# Patient Record
Sex: FEMALE | Race: WHITE | NOT HISPANIC OR LATINO | Employment: OTHER | URBAN - METROPOLITAN AREA
[De-identification: names, ages, dates, MRNs, and addresses within clinical notes are randomized per-mention and may not be internally consistent; named-entity substitution may affect disease eponyms.]

---

## 2021-03-22 ENCOUNTER — APPOINTMENT (EMERGENCY)
Dept: RADIOLOGY | Facility: HOSPITAL | Age: 76
DRG: 281 | End: 2021-03-22
Payer: COMMERCIAL

## 2021-03-22 ENCOUNTER — HOSPITAL ENCOUNTER (INPATIENT)
Facility: HOSPITAL | Age: 76
LOS: 1 days | DRG: 281 | End: 2021-03-23
Attending: EMERGENCY MEDICINE | Admitting: INTERNAL MEDICINE
Payer: COMMERCIAL

## 2021-03-22 DIAGNOSIS — I21.4 NSTEMI (NON-ST ELEVATED MYOCARDIAL INFARCTION) (HCC): Primary | ICD-10-CM

## 2021-03-22 PROBLEM — R07.9 CHEST PAIN: Status: ACTIVE | Noted: 2021-03-22

## 2021-03-22 PROBLEM — K21.9 GERD (GASTROESOPHAGEAL REFLUX DISEASE): Status: ACTIVE | Noted: 2021-03-22

## 2021-03-22 PROBLEM — R74.01 ELEVATED AST (SGOT): Status: ACTIVE | Noted: 2021-03-22

## 2021-03-22 LAB
ALBUMIN SERPL BCP-MCNC: 3.5 G/DL (ref 3.5–5)
ALBUMIN SERPL BCP-MCNC: 3.8 G/DL (ref 3.5–5)
ALP SERPL-CCNC: 55 U/L (ref 46–116)
ALP SERPL-CCNC: 63 U/L (ref 46–116)
ALT SERPL W P-5'-P-CCNC: 48 U/L (ref 12–78)
ALT SERPL W P-5'-P-CCNC: 48 U/L (ref 12–78)
ANION GAP SERPL CALCULATED.3IONS-SCNC: 10 MMOL/L (ref 4–13)
ANION GAP SERPL CALCULATED.3IONS-SCNC: 12 MMOL/L (ref 4–13)
APTT PPP: 28 SECONDS (ref 23–37)
AST SERPL W P-5'-P-CCNC: 289 U/L (ref 5–45)
AST SERPL W P-5'-P-CCNC: 340 U/L (ref 5–45)
BASOPHILS # BLD AUTO: 0.02 THOUSANDS/ΜL (ref 0–0.1)
BASOPHILS NFR BLD AUTO: 0 % (ref 0–1)
BILIRUB SERPL-MCNC: 0.4 MG/DL (ref 0.2–1)
BILIRUB SERPL-MCNC: 0.41 MG/DL (ref 0.2–1)
BUN SERPL-MCNC: 13 MG/DL (ref 5–25)
BUN SERPL-MCNC: 14 MG/DL (ref 5–25)
CALCIUM SERPL-MCNC: 8.8 MG/DL (ref 8.3–10.1)
CALCIUM SERPL-MCNC: 8.9 MG/DL (ref 8.3–10.1)
CHLORIDE SERPL-SCNC: 100 MMOL/L (ref 100–108)
CHLORIDE SERPL-SCNC: 104 MMOL/L (ref 100–108)
CO2 SERPL-SCNC: 24 MMOL/L (ref 21–32)
CO2 SERPL-SCNC: 28 MMOL/L (ref 21–32)
CREAT SERPL-MCNC: 0.92 MG/DL (ref 0.6–1.3)
CREAT SERPL-MCNC: 0.97 MG/DL (ref 0.6–1.3)
EOSINOPHIL # BLD AUTO: 0.03 THOUSAND/ΜL (ref 0–0.61)
EOSINOPHIL NFR BLD AUTO: 0 % (ref 0–6)
ERYTHROCYTE [DISTWIDTH] IN BLOOD BY AUTOMATED COUNT: 15 % (ref 11.6–15.1)
GFR SERPL CREATININE-BSD FRML MDRD: 58 ML/MIN/1.73SQ M
GFR SERPL CREATININE-BSD FRML MDRD: 62 ML/MIN/1.73SQ M
GLUCOSE SERPL-MCNC: 124 MG/DL (ref 65–140)
GLUCOSE SERPL-MCNC: 127 MG/DL (ref 65–140)
HCT VFR BLD AUTO: 42.2 % (ref 34.8–46.1)
HGB BLD-MCNC: 13.4 G/DL (ref 11.5–15.4)
IMM GRANULOCYTES # BLD AUTO: 0.06 THOUSAND/UL (ref 0–0.2)
IMM GRANULOCYTES NFR BLD AUTO: 1 % (ref 0–2)
INR PPP: 0.95 (ref 0.84–1.19)
LYMPHOCYTES # BLD AUTO: 1.99 THOUSANDS/ΜL (ref 0.6–4.47)
LYMPHOCYTES NFR BLD AUTO: 20 % (ref 14–44)
MCH RBC QN AUTO: 28.9 PG (ref 26.8–34.3)
MCHC RBC AUTO-ENTMCNC: 31.8 G/DL (ref 31.4–37.4)
MCV RBC AUTO: 91 FL (ref 82–98)
MONOCYTES # BLD AUTO: 0.47 THOUSAND/ΜL (ref 0.17–1.22)
MONOCYTES NFR BLD AUTO: 5 % (ref 4–12)
NEUTROPHILS # BLD AUTO: 7.34 THOUSANDS/ΜL (ref 1.85–7.62)
NEUTS SEG NFR BLD AUTO: 74 % (ref 43–75)
NRBC BLD AUTO-RTO: 0 /100 WBCS
PLATELET # BLD AUTO: 216 THOUSANDS/UL (ref 149–390)
PMV BLD AUTO: 10.1 FL (ref 8.9–12.7)
POTASSIUM SERPL-SCNC: 4.3 MMOL/L (ref 3.5–5.3)
POTASSIUM SERPL-SCNC: 4.7 MMOL/L (ref 3.5–5.3)
PROT SERPL-MCNC: 7.2 G/DL (ref 6.4–8.2)
PROT SERPL-MCNC: 8 G/DL (ref 6.4–8.2)
PROTHROMBIN TIME: 12.8 SECONDS (ref 11.6–14.5)
RBC # BLD AUTO: 4.64 MILLION/UL (ref 3.81–5.12)
SODIUM SERPL-SCNC: 138 MMOL/L (ref 136–145)
SODIUM SERPL-SCNC: 140 MMOL/L (ref 136–145)
TROPONIN I SERPL-MCNC: >40 NG/ML
TROPONIN I SERPL-MCNC: >40 NG/ML
WBC # BLD AUTO: 9.91 THOUSAND/UL (ref 4.31–10.16)

## 2021-03-22 PROCEDURE — 93005 ELECTROCARDIOGRAM TRACING: CPT

## 2021-03-22 PROCEDURE — 80053 COMPREHEN METABOLIC PANEL: CPT | Performed by: EMERGENCY MEDICINE

## 2021-03-22 PROCEDURE — 85025 COMPLETE CBC W/AUTO DIFF WBC: CPT | Performed by: EMERGENCY MEDICINE

## 2021-03-22 PROCEDURE — 85730 THROMBOPLASTIN TIME PARTIAL: CPT | Performed by: EMERGENCY MEDICINE

## 2021-03-22 PROCEDURE — 99291 CRITICAL CARE FIRST HOUR: CPT | Performed by: EMERGENCY MEDICINE

## 2021-03-22 PROCEDURE — 84484 ASSAY OF TROPONIN QUANT: CPT | Performed by: EMERGENCY MEDICINE

## 2021-03-22 PROCEDURE — 84484 ASSAY OF TROPONIN QUANT: CPT | Performed by: PHYSICIAN ASSISTANT

## 2021-03-22 PROCEDURE — 36415 COLL VENOUS BLD VENIPUNCTURE: CPT | Performed by: EMERGENCY MEDICINE

## 2021-03-22 PROCEDURE — 99223 1ST HOSP IP/OBS HIGH 75: CPT | Performed by: PHYSICIAN ASSISTANT

## 2021-03-22 PROCEDURE — 71045 X-RAY EXAM CHEST 1 VIEW: CPT

## 2021-03-22 PROCEDURE — 96375 TX/PRO/DX INJ NEW DRUG ADDON: CPT

## 2021-03-22 PROCEDURE — 85610 PROTHROMBIN TIME: CPT | Performed by: EMERGENCY MEDICINE

## 2021-03-22 PROCEDURE — 99291 CRITICAL CARE FIRST HOUR: CPT

## 2021-03-22 PROCEDURE — 96365 THER/PROPH/DIAG IV INF INIT: CPT

## 2021-03-22 PROCEDURE — 80053 COMPREHEN METABOLIC PANEL: CPT | Performed by: PHYSICIAN ASSISTANT

## 2021-03-22 RX ORDER — ATORVASTATIN CALCIUM 80 MG/1
80 TABLET, FILM COATED ORAL DAILY
COMMUNITY

## 2021-03-22 RX ORDER — ASPIRIN 81 MG/1
324 TABLET, CHEWABLE ORAL ONCE
Status: DISCONTINUED | OUTPATIENT
Start: 2021-03-22 | End: 2021-03-22

## 2021-03-22 RX ORDER — AMLODIPINE BESYLATE 5 MG/1
5 TABLET ORAL DAILY
COMMUNITY
End: 2021-03-22

## 2021-03-22 RX ORDER — HEPARIN SODIUM 10000 [USP'U]/100ML
3-20 INJECTION, SOLUTION INTRAVENOUS
Status: DISCONTINUED | OUTPATIENT
Start: 2021-03-22 | End: 2021-03-23 | Stop reason: HOSPADM

## 2021-03-22 RX ORDER — AMLODIPINE BESYLATE 5 MG/1
5 TABLET ORAL DAILY
Status: DISCONTINUED | OUTPATIENT
Start: 2021-03-23 | End: 2021-03-22

## 2021-03-22 RX ORDER — PANTOPRAZOLE SODIUM 40 MG/1
40 TABLET, DELAYED RELEASE ORAL
Status: DISCONTINUED | OUTPATIENT
Start: 2021-03-23 | End: 2021-03-23

## 2021-03-22 RX ORDER — ONDANSETRON 2 MG/ML
4 INJECTION INTRAMUSCULAR; INTRAVENOUS EVERY 6 HOURS PRN
Status: DISCONTINUED | OUTPATIENT
Start: 2021-03-22 | End: 2021-03-23

## 2021-03-22 RX ORDER — ATENOLOL 25 MG/1
50 TABLET ORAL 2 TIMES DAILY
Status: DISCONTINUED | OUTPATIENT
Start: 2021-03-23 | End: 2021-03-23

## 2021-03-22 RX ORDER — ACETAMINOPHEN 325 MG/1
650 TABLET ORAL EVERY 6 HOURS PRN
Status: DISCONTINUED | OUTPATIENT
Start: 2021-03-22 | End: 2021-03-22

## 2021-03-22 RX ORDER — NICOTINE POLACRILEX 4 MG/1
20 GUM, CHEWING ORAL DAILY
COMMUNITY

## 2021-03-22 RX ORDER — ATORVASTATIN CALCIUM 40 MG/1
80 TABLET, FILM COATED ORAL EVERY EVENING
Status: DISCONTINUED | OUTPATIENT
Start: 2021-03-22 | End: 2021-03-23 | Stop reason: HOSPADM

## 2021-03-22 RX ORDER — ASPIRIN 81 MG/1
81 TABLET, CHEWABLE ORAL DAILY
Status: DISCONTINUED | OUTPATIENT
Start: 2021-03-23 | End: 2021-03-23 | Stop reason: HOSPADM

## 2021-03-22 RX ORDER — ATENOLOL 25 MG/1
50 TABLET ORAL 2 TIMES DAILY
Status: DISCONTINUED | OUTPATIENT
Start: 2021-03-22 | End: 2021-03-22

## 2021-03-22 RX ORDER — HEPARIN SODIUM 1000 [USP'U]/ML
4000 INJECTION, SOLUTION INTRAVENOUS; SUBCUTANEOUS ONCE
Status: COMPLETED | OUTPATIENT
Start: 2021-03-22 | End: 2021-03-22

## 2021-03-22 RX ORDER — METOPROLOL TARTRATE 5 MG/5ML
5 INJECTION INTRAVENOUS EVERY 6 HOURS PRN
Status: DISCONTINUED | OUTPATIENT
Start: 2021-03-22 | End: 2021-03-23

## 2021-03-22 RX ORDER — ATENOLOL 50 MG/1
50 TABLET ORAL 2 TIMES DAILY
COMMUNITY
End: 2021-04-05 | Stop reason: HOSPADM

## 2021-03-22 RX ADMIN — METOPROLOL TARTRATE 5 MG: 5 INJECTION INTRAVENOUS at 22:31

## 2021-03-22 RX ADMIN — HEPARIN SODIUM 4000 UNITS: 1000 INJECTION INTRAVENOUS; SUBCUTANEOUS at 19:35

## 2021-03-22 RX ADMIN — NITROGLYCERIN 1 INCH: 20 OINTMENT TOPICAL at 22:33

## 2021-03-22 RX ADMIN — TICAGRELOR 180 MG: 90 TABLET ORAL at 20:31

## 2021-03-22 RX ADMIN — HEPARIN SODIUM 11.1 UNITS/KG/HR: 10000 INJECTION, SOLUTION INTRAVENOUS at 19:37

## 2021-03-22 NOTE — ED PROVIDER NOTES
History  Chief Complaint   Patient presents with    Chest Pain     pt states around 730 am she started w/ central CP, no radiation and "small episode of diaphoresis and nausea " pts symtpoms improving since this morning  65 asa given      70 y o  female presents via EMS after her grandson called the local squad  She reports this morning around 7:30 she had chest pain, dyspnea, cold sweats and nausea which resolved  She reports she was left with a very minor discomfort in a pinpoint position over her left chest   She informed her grandson of this slight discomfort when he returned from work this afternoon which is when he called over to the ems crew  Upon EMS arrival the patient had no complaints but their initial EKG showed some elevation in III and AVF with possible reciprocal changes in AVL  History provided by:  Patient, medical records and EMS personnel   used: No    Chest Pain  Pain location:  L chest  Pain quality: aching    Pain radiates to:  Does not radiate  Pain radiates to the back: no    Pain severity:  Mild  Onset quality:  Sudden  Duration:  12 hours  Timing:  Constant  Progression:  Improving  Chronicity:  New  Relieved by:  Nothing  Worsened by:  Nothing tried  Ineffective treatments:  None tried  Associated symptoms: diaphoresis, nausea and shortness of breath    Associated symptoms: no fever, no palpitations and not vomiting    Associated symptoms comment:  All associated symptoms are now resolved   Risk factors: diabetes mellitus and obesity        None       Past Medical History:   Diagnosis Date    Diabetes mellitus ([facility] [address] )     Hypertension        History reviewed  No pertinent surgical history  History reviewed  No pertinent family history  I have reviewed and agree with the history as documented      E-Cigarette/Vaping     E-Cigarette/Vaping Substances     Social History     Tobacco Use    Smoking status: Never Smoker   Substance Use Topics    Alcohol use: Not Currently    Drug use: Not Currently       Review of Systems   Constitutional: Positive for diaphoresis  Negative for chills and fever  Respiratory: Positive for shortness of breath  Cardiovascular: Positive for chest pain  Negative for palpitations  Gastrointestinal: Positive for nausea  Negative for diarrhea and vomiting  Genitourinary: Negative for dysuria and frequency  Skin: Negative for rash  All other systems reviewed and are negative  Physical Exam  Physical Exam  Vitals signs and nursing note reviewed  Constitutional:       General: She is not in acute distress  Appearance: She is well-developed  She is obese  HENT:      Head: Normocephalic and atraumatic  Eyes:      Pupils: Pupils are equal, round, and reactive to light  Neck:      Musculoskeletal: Normal range of motion  Vascular: No JVD  Cardiovascular:      Rate and Rhythm: Normal rate and regular rhythm  Heart sounds: Normal heart sounds  No murmur  No friction rub  No gallop  Pulmonary:      Effort: Pulmonary effort is normal  No respiratory distress  Breath sounds: Normal breath sounds  No wheezing or rales  Chest:      Chest wall: No tenderness  Musculoskeletal: Normal range of motion  General: No tenderness  Skin:     General: Skin is warm and dry  Coloration: Skin is pale  Neurological:      General: No focal deficit present  Mental Status: She is alert and oriented to person, place, and time  Psychiatric:         Behavior: Behavior normal          Thought Content:  Thought content normal          Judgment: Judgment normal          Vital Signs  ED Triage Vitals   Temp Pulse Respirations Blood Pressure SpO2   -- 03/22/21 1915 03/22/21 1915 03/22/21 1917 03/22/21 1915    90 18 146/92 94 %      Temp src Heart Rate Source Patient Position - Orthostatic VS BP Location FiO2 (%)   -- 03/22/21 1915 -- -- --    Monitor         Pain Score       03/22/21 1915       1 Vitals:    03/22/21 1915 03/22/21 1917   BP:  146/92   Pulse: 90          Visual Acuity      ED Medications  Medications   heparin (porcine) 25,000 units in 0 45% NaCl 250 mL infusion (premix) (11 1 Units/kg/hr × 90 kg (Order-Specific) Intravenous New Bag 3/22/21 1937)   ticagrelor (BRILINTA) tablet 180 mg (has no administration in time range)   heparin (porcine) injection 4,000 Units (4,000 Units Intravenous Given 3/22/21 1935)       Diagnostic Studies  Results Reviewed     Procedure Component Value Units Date/Time    Troponin I [[id number]]  (Abnormal) Collected: 03/22/21 1919    Lab Status: Final result Specimen: Blood from Arm, Right Updated: 03/22/21 1949     Troponin I >40 00 ng/mL     Comprehensive metabolic panel [[id number]]  (Abnormal) Collected: 03/22/21 1919    Lab Status: Final result Specimen: Blood from Arm, Right Updated: 03/22/21 1941     Sodium 140 mmol/L      Potassium 4 7 mmol/L      Chloride 100 mmol/L      CO2 28 mmol/L      ANION GAP 12 mmol/L      BUN 14 mg/dL      Creatinine 0 97 mg/dL      Glucose 124 mg/dL      Calcium 8 9 mg/dL       U/L      ALT 48 U/L      Alkaline Phosphatase 63 U/L      Total Protein 8 0 g/dL      Albumin 3 8 g/dL      Total Bilirubin 0 40 mg/dL      eGFR 58 ml/min/1 73sq m     Narrative:      [place] guidelines for Chronic Kidney Disease (CKD):     Stage 1 with normal or high GFR (GFR > 90 mL/min/1 73 square meters)    Stage 2 Mild CKD (GFR = 60-89 mL/min/1 73 square meters)    Stage 3A Moderate CKD (GFR = 45-59 mL/min/1 73 square meters)    Stage 3B Moderate CKD (GFR = 30-44 mL/min/1 73 square meters)    Stage 4 Severe CKD (GFR = 15-29 mL/min/1 73 square meters)    Stage 5 End Stage CKD (GFR <15 mL/min/1 73 square meters)  Note: GFR calculation is accurate only with a steady state creatinine    APTT six (6) hours after Heparin bolus/drip initiation or dosing change [[id number]]  (Normal) Collected: 03/22/21 1919    Lab Status: Final result Specimen: Blood from Arm, Right Updated: 03/22/21 1937     PTT 28 seconds     Protime-INR [[id number]]  (Normal) Collected: 03/22/21 1919    Lab Status: Final result Specimen: Blood from Arm, Right Updated: 03/22/21 1937     Protime 12 8 seconds      INR 0 95    CBC and differential [[id number]] Collected: 03/22/21 1919    Lab Status: Final result Specimen: Blood from Arm, Right Updated: 03/22/21 1928     WBC 9 91 Thousand/uL      RBC 4 64 Million/uL      Hemoglobin 13 4 g/dL      Hematocrit 42 2 %      MCV 91 fL      MCH 28 9 pg      MCHC 31 8 g/dL      RDW 15 0 %      MPV 10 1 fL      Platelets 888 Thousands/uL      nRBC 0 /100 WBCs      Neutrophils Relative 74 %      Immat GRANS % 1 %      Lymphocytes Relative 20 %      Monocytes Relative 5 %      Eosinophils Relative 0 %      Basophils Relative 0 %      Neutrophils Absolute 7 34 Thousands/µL      Immature Grans Absolute 0 06 Thousand/uL      Lymphocytes Absolute 1 99 Thousands/µL      Monocytes Absolute 0 47 Thousand/µL      Eosinophils Absolute 0 03 Thousand/µL      Basophils Absolute 0 02 Thousands/µL                  XR chest 1 view portable   ED Interpretation by Amrita Lyman MD (03/22 2015)   This film was interpreted independently by me  No infiltrate, cardiac silhouette normal, no pleural effusions or pulmonary edema  Left arm surgical hardware noted                    Procedures  ECG 12 Lead Documentation Only    Date/Time: 3/22/2021 7:18 PM  Performed by: Amrita Lyman MD  Authorized by: Amrita Lyman MD     Indications / Diagnosis:  Chest pain  ECG reviewed by me, the ED Provider: yes    Patient location:  ED  Previous ECG:     Previous ECG:  Compared to current    Comparison ECG info:  01/05/2001    Similarity:  Changes noted  Interpretation:     Interpretation: abnormal    Rate:     ECG rate:  85    ECG rate assessment: normal    Rhythm:     Rhythm: sinus rhythm    Ectopy:     Ectopy: PVCs      PVCs:  Frequent  QRS:     QRS axis:  Left  Conduction:     Conduction: normal    ST segments:     ST segments:  Abnormal    Depression:  V2  T waves:     T waves: normal      CriticalCare Time  Performed by: Carlos Alberto Arriaza MD  Authorized by: Carlos Alberto Arriaza MD     Critical care provider statement:     Critical care time (minutes):  45    Critical care time was exclusive of:  Separately billable procedures and treating other patients and teaching time    Critical care was necessary to treat or prevent imminent or life-threatening deterioration of the following conditions:  Cardiac failure    Critical care was time spent personally by me on the following activities:  Blood draw for specimens, obtaining history from patient or surrogate, development of treatment plan with patient or surrogate, discussions with consultants, evaluation of patient's response to treatment, examination of patient, interpretation of cardiac output measurements, ordering and performing treatments and interventions, ordering and review of laboratory studies, ordering and review of radiographic studies, re-evaluation of patient's condition and review of old charts             ED Course  ED Course as of Mar 22 2030   Mon Mar 22, 2021   2027 I spoke with Dr Rocio Funk of cardiology and he agrees current symptoms and ekg does not meet MI alert criteria  He requests we add a platelet inhibitor (plavix or brillinta) to the asa and heparin and admit to [facility]  He said to have a low threshold to shoot another EKG and call cards should symptoms return                                                 MDM  Number of Diagnoses or Management Options  NSTEMI (non-ST elevated myocardial infarction) [facility]): new and requires workup  Diagnosis management comments: Background: 70 y o  female with chest pain    Differential DX includes but is not limited to: acs/mi, pe, pleurisy, dissection, pneumonia, musculoskeletal chest pain    Plan: cardiac workup - at this time I believe the patient likely had a MI this morning, currently without pain, EKG here in the department does not meet MI alert criteria  Based on reported symptoms this morning and EMS ekg I will start heparin while awaiting troponin result  If negative I will discontinue the heparin  Amount and/or Complexity of Data Reviewed  Clinical lab tests: ordered and reviewed  Tests in the radiology section of CPT®: ordered and reviewed  Independent visualization of images, tracings, or specimens: yes    Risk of Complications, Morbidity, and/or Mortality  Presenting problems: high  Diagnostic procedures: high  Management options: high    Patient Progress  Patient progress: stable      Disposition  Final diagnoses:   NSTEMI (non-ST elevated myocardial infarction) ([facility] [address] )     Time reflects when diagnosis was documented in both MDM as applicable and the Disposition within this note     Time User Action Codes Description Comment    3/22/2021  8:14 PM Lincoln Holman[address] [I21 4] NSTEMI (non-ST elevated myocardial infarction) [facility])       ED Disposition     ED Disposition Condition Date/Time Comment    Admit Stable Mon Mar 22, 2021  8:28 PM Case was discussed with Sabrina Saxena and the patient's admission status was agreed to be Admission Status: inpatient status to the service of Dr Pastor Wright   Follow-up Information    None         Patient's Medications    No medications on file     No discharge procedures on file      PDMP Review     None          ED Provider  Electronically Signed by           Capo Orellana MD  03/22/21 2030

## 2021-03-23 ENCOUNTER — HOSPITAL ENCOUNTER (INPATIENT)
Facility: HOSPITAL | Age: 76
LOS: 13 days | Discharge: HOME HEALTH | DRG: 236 | End: 2021-04-05
Attending: INTERNAL MEDICINE | Admitting: THORACIC SURGERY (CARDIOTHORACIC VASCULAR SURGERY)
Payer: COMMERCIAL

## 2021-03-23 ENCOUNTER — APPOINTMENT (INPATIENT)
Dept: NON INVASIVE DIAGNOSTICS | Facility: HOSPITAL | Age: 76
DRG: 281 | End: 2021-03-23
Payer: COMMERCIAL

## 2021-03-23 VITALS
HEART RATE: 74 BPM | RESPIRATION RATE: 28 BRPM | TEMPERATURE: 98 F | BODY MASS INDEX: 39.39 KG/M2 | HEIGHT: 62 IN | SYSTOLIC BLOOD PRESSURE: 120 MMHG | WEIGHT: 214.07 LBS | OXYGEN SATURATION: 96 % | DIASTOLIC BLOOD PRESSURE: 55 MMHG

## 2021-03-23 DIAGNOSIS — E11.9 DIABETES MELLITUS (HCC): ICD-10-CM

## 2021-03-23 DIAGNOSIS — I16.0 HYPERTENSIVE URGENCY: ICD-10-CM

## 2021-03-23 DIAGNOSIS — I65.23 BILATERAL CAROTID ARTERY STENOSIS: ICD-10-CM

## 2021-03-23 DIAGNOSIS — I25.119 CORONARY ARTERY DISEASE INVOLVING NATIVE CORONARY ARTERY OF NATIVE HEART WITH ANGINA PECTORIS (HCC): ICD-10-CM

## 2021-03-23 DIAGNOSIS — Z01.818 PRE-OP EVALUATION: ICD-10-CM

## 2021-03-23 DIAGNOSIS — Z95.1 S/P CABG (CORONARY ARTERY BYPASS GRAFT): Primary | ICD-10-CM

## 2021-03-23 DIAGNOSIS — K21.9 GERD (GASTROESOPHAGEAL REFLUX DISEASE): ICD-10-CM

## 2021-03-23 DIAGNOSIS — R74.01 ELEVATED AST (SGOT): ICD-10-CM

## 2021-03-23 DIAGNOSIS — I21.4 NSTEMI (NON-ST ELEVATED MYOCARDIAL INFARCTION) (HCC): ICD-10-CM

## 2021-03-23 PROBLEM — I47.1 SVT (SUPRAVENTRICULAR TACHYCARDIA) (HCC): Status: ACTIVE | Noted: 2021-03-23

## 2021-03-23 LAB
ALBUMIN SERPL BCP-MCNC: 3.4 G/DL (ref 3.5–5)
ALP SERPL-CCNC: 55 U/L (ref 46–116)
ALT SERPL W P-5'-P-CCNC: 60 U/L (ref 12–78)
ANION GAP SERPL CALCULATED.3IONS-SCNC: 13 MMOL/L (ref 4–13)
APTT PPP: 71 SECONDS (ref 23–37)
APTT PPP: 74 SECONDS (ref 23–37)
AST SERPL W P-5'-P-CCNC: 444 U/L (ref 5–45)
ATRIAL RATE: 61 BPM
ATRIAL RATE: 67 BPM
ATRIAL RATE: 73 BPM
ATRIAL RATE: 76 BPM
BILIRUB SERPL-MCNC: 0.54 MG/DL (ref 0.2–1)
BUN SERPL-MCNC: 12 MG/DL (ref 5–25)
CALCIUM ALBUM COR SERPL-MCNC: 8.7 MG/DL (ref 8.3–10.1)
CALCIUM SERPL-MCNC: 8.2 MG/DL (ref 8.3–10.1)
CHLORIDE SERPL-SCNC: 100 MMOL/L (ref 100–108)
CHOLEST SERPL-MCNC: 195 MG/DL (ref 50–200)
CO2 SERPL-SCNC: 25 MMOL/L (ref 21–32)
CREAT SERPL-MCNC: 0.89 MG/DL (ref 0.6–1.3)
ERYTHROCYTE [DISTWIDTH] IN BLOOD BY AUTOMATED COUNT: 15.1 % (ref 11.6–15.1)
EST. AVERAGE GLUCOSE BLD GHB EST-MCNC: 137 MG/DL
GFR SERPL CREATININE-BSD FRML MDRD: 65 ML/MIN/1.73SQ M
GLUCOSE SERPL-MCNC: 122 MG/DL (ref 65–140)
GLUCOSE SERPL-MCNC: 137 MG/DL (ref 65–140)
GLUCOSE SERPL-MCNC: 139 MG/DL (ref 65–140)
GLUCOSE SERPL-MCNC: 142 MG/DL (ref 65–140)
GLUCOSE SERPL-MCNC: 150 MG/DL (ref 65–140)
GLUCOSE SERPL-MCNC: 151 MG/DL (ref 65–140)
HBA1C MFR BLD: 6.4 %
HCT VFR BLD AUTO: 37.5 % (ref 34.8–46.1)
HDLC SERPL-MCNC: 51 MG/DL
HGB BLD-MCNC: 12.3 G/DL (ref 11.5–15.4)
LDLC SERPL CALC-MCNC: 110 MG/DL (ref 0–100)
MAGNESIUM SERPL-MCNC: 0.9 MG/DL (ref 1.6–2.6)
MAGNESIUM SERPL-MCNC: 1.6 MG/DL (ref 1.6–2.6)
MCH RBC QN AUTO: 29.1 PG (ref 26.8–34.3)
MCHC RBC AUTO-ENTMCNC: 32.8 G/DL (ref 31.4–37.4)
MCV RBC AUTO: 89 FL (ref 82–98)
NONHDLC SERPL-MCNC: 144 MG/DL
NT-PROBNP SERPL-MCNC: 5102 PG/ML
P AXIS: 123 DEGREES
P AXIS: 138 DEGREES
P AXIS: 60 DEGREES
P AXIS: 72 DEGREES
PHOSPHATE SERPL-MCNC: 4.1 MG/DL (ref 2.3–4.1)
PLATELET # BLD AUTO: 195 THOUSANDS/UL (ref 149–390)
PMV BLD AUTO: 10.6 FL (ref 8.9–12.7)
POTASSIUM SERPL-SCNC: 4 MMOL/L (ref 3.5–5.3)
PR INTERVAL: 134 MS
PR INTERVAL: 142 MS
PR INTERVAL: 154 MS
PR INTERVAL: 156 MS
PROT SERPL-MCNC: 7.1 G/DL (ref 6.4–8.2)
QRS AXIS: -24 DEGREES
QRS AXIS: -28 DEGREES
QRS AXIS: 196 DEGREES
QRS AXIS: 202 DEGREES
QRSD INTERVAL: 84 MS
QRSD INTERVAL: 86 MS
QT INTERVAL: 468 MS
QT INTERVAL: 486 MS
QT INTERVAL: 500 MS
QT INTERVAL: 526 MS
QTC INTERVAL: 515 MS
QTC INTERVAL: 528 MS
QTC INTERVAL: 529 MS
QTC INTERVAL: 546 MS
RBC # BLD AUTO: 4.22 MILLION/UL (ref 3.81–5.12)
SODIUM SERPL-SCNC: 138 MMOL/L (ref 136–145)
T WAVE AXIS: 136 DEGREES
T WAVE AXIS: 145 DEGREES
T WAVE AXIS: 41 DEGREES
T WAVE AXIS: 61 DEGREES
TRIGL SERPL-MCNC: 169 MG/DL
TROPONIN I SERPL-MCNC: >40 NG/ML
TSH SERPL DL<=0.05 MIU/L-ACNC: 1.56 UIU/ML (ref 0.36–3.74)
VENTRICULAR RATE: 61 BPM
VENTRICULAR RATE: 67 BPM
VENTRICULAR RATE: 73 BPM
VENTRICULAR RATE: 76 BPM
WBC # BLD AUTO: 10.94 THOUSAND/UL (ref 4.31–10.16)

## 2021-03-23 PROCEDURE — 93010 ELECTROCARDIOGRAM REPORT: CPT | Performed by: INTERNAL MEDICINE

## 2021-03-23 PROCEDURE — 99153 MOD SED SAME PHYS/QHP EA: CPT | Performed by: NURSE PRACTITIONER

## 2021-03-23 PROCEDURE — 83036 HEMOGLOBIN GLYCOSYLATED A1C: CPT | Performed by: PHYSICIAN ASSISTANT

## 2021-03-23 PROCEDURE — 99223 1ST HOSP IP/OBS HIGH 75: CPT | Performed by: INTERNAL MEDICINE

## 2021-03-23 PROCEDURE — B2111ZZ FLUOROSCOPY OF MULTIPLE CORONARY ARTERIES USING LOW OSMOLAR CONTRAST: ICD-10-PCS | Performed by: INTERNAL MEDICINE

## 2021-03-23 PROCEDURE — NC001 PR NO CHARGE: Performed by: PHYSICIAN ASSISTANT

## 2021-03-23 PROCEDURE — 84484 ASSAY OF TROPONIN QUANT: CPT | Performed by: PHYSICIAN ASSISTANT

## 2021-03-23 PROCEDURE — 93454 CORONARY ARTERY ANGIO S&I: CPT | Performed by: NURSE PRACTITIONER

## 2021-03-23 PROCEDURE — 93306 TTE W/DOPPLER COMPLETE: CPT | Performed by: INTERNAL MEDICINE

## 2021-03-23 PROCEDURE — 99152 MOD SED SAME PHYS/QHP 5/>YRS: CPT | Performed by: NURSE PRACTITIONER

## 2021-03-23 PROCEDURE — 83880 ASSAY OF NATRIURETIC PEPTIDE: CPT | Performed by: NURSE PRACTITIONER

## 2021-03-23 PROCEDURE — C1887 CATHETER, GUIDING: HCPCS | Performed by: NURSE PRACTITIONER

## 2021-03-23 PROCEDURE — 80061 LIPID PANEL: CPT | Performed by: PHYSICIAN ASSISTANT

## 2021-03-23 PROCEDURE — 99152 MOD SED SAME PHYS/QHP 5/>YRS: CPT | Performed by: INTERNAL MEDICINE

## 2021-03-23 PROCEDURE — 99291 CRITICAL CARE FIRST HOUR: CPT | Performed by: NURSE PRACTITIONER

## 2021-03-23 PROCEDURE — C1769 GUIDE WIRE: HCPCS | Performed by: NURSE PRACTITIONER

## 2021-03-23 PROCEDURE — C1894 INTRO/SHEATH, NON-LASER: HCPCS | Performed by: NURSE PRACTITIONER

## 2021-03-23 PROCEDURE — 84484 ASSAY OF TROPONIN QUANT: CPT | Performed by: NURSE PRACTITIONER

## 2021-03-23 PROCEDURE — 93454 CORONARY ARTERY ANGIO S&I: CPT | Performed by: INTERNAL MEDICINE

## 2021-03-23 PROCEDURE — 84100 ASSAY OF PHOSPHORUS: CPT | Performed by: PHYSICIAN ASSISTANT

## 2021-03-23 PROCEDURE — 85027 COMPLETE CBC AUTOMATED: CPT | Performed by: PHYSICIAN ASSISTANT

## 2021-03-23 PROCEDURE — 93306 TTE W/DOPPLER COMPLETE: CPT

## 2021-03-23 PROCEDURE — 85730 THROMBOPLASTIN TIME PARTIAL: CPT | Performed by: PHYSICIAN ASSISTANT

## 2021-03-23 PROCEDURE — 36415 COLL VENOUS BLD VENIPUNCTURE: CPT | Performed by: PHYSICIAN ASSISTANT

## 2021-03-23 PROCEDURE — 93005 ELECTROCARDIOGRAM TRACING: CPT

## 2021-03-23 PROCEDURE — 85730 THROMBOPLASTIN TIME PARTIAL: CPT | Performed by: NURSE PRACTITIONER

## 2021-03-23 PROCEDURE — 80053 COMPREHEN METABOLIC PANEL: CPT | Performed by: PHYSICIAN ASSISTANT

## 2021-03-23 PROCEDURE — 82948 REAGENT STRIP/BLOOD GLUCOSE: CPT

## 2021-03-23 PROCEDURE — 83735 ASSAY OF MAGNESIUM: CPT | Performed by: PHYSICIAN ASSISTANT

## 2021-03-23 PROCEDURE — 84443 ASSAY THYROID STIM HORMONE: CPT | Performed by: NURSE PRACTITIONER

## 2021-03-23 RX ORDER — PANTOPRAZOLE SODIUM 40 MG/1
40 TABLET, DELAYED RELEASE ORAL
Status: DISCONTINUED | OUTPATIENT
Start: 2021-03-23 | End: 2021-03-23 | Stop reason: HOSPADM

## 2021-03-23 RX ORDER — AMLODIPINE BESYLATE 5 MG/1
5 TABLET ORAL DAILY
Status: DISCONTINUED | OUTPATIENT
Start: 2021-03-23 | End: 2021-03-23 | Stop reason: HOSPADM

## 2021-03-23 RX ORDER — PANTOPRAZOLE SODIUM 40 MG/1
40 TABLET, DELAYED RELEASE ORAL
Status: DISCONTINUED | OUTPATIENT
Start: 2021-03-24 | End: 2021-03-31 | Stop reason: HOSPADM

## 2021-03-23 RX ORDER — AMLODIPINE BESYLATE 5 MG/1
5 TABLET ORAL DAILY
Status: CANCELLED | OUTPATIENT
Start: 2021-03-24

## 2021-03-23 RX ORDER — METOPROLOL TARTRATE 5 MG/5ML
5 INJECTION INTRAVENOUS ONCE
Status: COMPLETED | OUTPATIENT
Start: 2021-03-23 | End: 2021-03-23

## 2021-03-23 RX ORDER — CARVEDILOL 6.25 MG/1
6.25 TABLET ORAL 2 TIMES DAILY WITH MEALS
Status: DISCONTINUED | OUTPATIENT
Start: 2021-03-24 | End: 2021-03-29

## 2021-03-23 RX ORDER — HEPARIN SODIUM 10000 [USP'U]/100ML
3-20 INJECTION, SOLUTION INTRAVENOUS
Status: CANCELLED | OUTPATIENT
Start: 2021-03-23

## 2021-03-23 RX ORDER — AMLODIPINE BESYLATE 5 MG/1
5 TABLET ORAL DAILY
Status: DISCONTINUED | OUTPATIENT
Start: 2021-03-24 | End: 2021-03-31 | Stop reason: HOSPADM

## 2021-03-23 RX ORDER — NITROGLYCERIN 20 MG/100ML
5-200 INJECTION INTRAVENOUS
Status: DISCONTINUED | OUTPATIENT
Start: 2021-03-23 | End: 2021-03-23

## 2021-03-23 RX ORDER — ASPIRIN 81 MG/1
81 TABLET, CHEWABLE ORAL DAILY
Status: DISCONTINUED | OUTPATIENT
Start: 2021-03-24 | End: 2021-03-31 | Stop reason: HOSPADM

## 2021-03-23 RX ORDER — MIDAZOLAM HYDROCHLORIDE 2 MG/2ML
INJECTION, SOLUTION INTRAMUSCULAR; INTRAVENOUS CODE/TRAUMA/SEDATION MEDICATION
Status: COMPLETED | OUTPATIENT
Start: 2021-03-23 | End: 2021-03-23

## 2021-03-23 RX ORDER — MAGNESIUM HYDROXIDE/ALUMINUM HYDROXICE/SIMETHICONE 120; 1200; 1200 MG/30ML; MG/30ML; MG/30ML
30 SUSPENSION ORAL EVERY 4 HOURS PRN
Status: DISCONTINUED | OUTPATIENT
Start: 2021-03-23 | End: 2021-03-23

## 2021-03-23 RX ORDER — VERAPAMIL HCL 2.5 MG/ML
AMPUL (ML) INTRAVENOUS CODE/TRAUMA/SEDATION MEDICATION
Status: COMPLETED | OUTPATIENT
Start: 2021-03-23 | End: 2021-03-23

## 2021-03-23 RX ORDER — HEPARIN SODIUM 1000 [USP'U]/ML
INJECTION, SOLUTION INTRAVENOUS; SUBCUTANEOUS CODE/TRAUMA/SEDATION MEDICATION
Status: COMPLETED | OUTPATIENT
Start: 2021-03-23 | End: 2021-03-23

## 2021-03-23 RX ORDER — PANTOPRAZOLE SODIUM 40 MG/1
40 TABLET, DELAYED RELEASE ORAL
Status: CANCELLED | OUTPATIENT
Start: 2021-03-24

## 2021-03-23 RX ORDER — ASPIRIN 81 MG/1
81 TABLET, CHEWABLE ORAL DAILY
Status: CANCELLED | OUTPATIENT
Start: 2021-03-24

## 2021-03-23 RX ORDER — CARVEDILOL 6.25 MG/1
6.25 TABLET ORAL 2 TIMES DAILY WITH MEALS
Status: CANCELLED | OUTPATIENT
Start: 2021-03-23

## 2021-03-23 RX ORDER — SODIUM CHLORIDE 9 MG/ML
100 INJECTION, SOLUTION INTRAVENOUS CONTINUOUS
Status: DISPENSED | OUTPATIENT
Start: 2021-03-23 | End: 2021-03-23

## 2021-03-23 RX ORDER — ATORVASTATIN CALCIUM 80 MG/1
80 TABLET, FILM COATED ORAL EVERY EVENING
Status: DISCONTINUED | OUTPATIENT
Start: 2021-03-24 | End: 2021-03-31 | Stop reason: HOSPADM

## 2021-03-23 RX ORDER — METOPROLOL TARTRATE 50 MG/1
50 TABLET, FILM COATED ORAL EVERY 12 HOURS SCHEDULED
Status: DISCONTINUED | OUTPATIENT
Start: 2021-03-23 | End: 2021-03-23

## 2021-03-23 RX ORDER — ASPIRIN 81 MG/1
243 TABLET, CHEWABLE ORAL ONCE
Status: CANCELLED | OUTPATIENT
Start: 2021-03-23 | End: 2021-03-23

## 2021-03-23 RX ORDER — HEPARIN SODIUM 10000 [USP'U]/100ML
3-20 INJECTION, SOLUTION INTRAVENOUS
Status: DISPENSED | OUTPATIENT
Start: 2021-03-23 | End: 2021-03-29

## 2021-03-23 RX ORDER — SODIUM CHLORIDE 9 MG/ML
100 INJECTION, SOLUTION INTRAVENOUS CONTINUOUS
Status: CANCELLED | OUTPATIENT
Start: 2021-03-23 | End: 2021-03-23

## 2021-03-23 RX ORDER — CARVEDILOL 6.25 MG/1
6.25 TABLET ORAL 2 TIMES DAILY WITH MEALS
Status: DISCONTINUED | OUTPATIENT
Start: 2021-03-23 | End: 2021-03-23 | Stop reason: HOSPADM

## 2021-03-23 RX ORDER — MAGNESIUM SULFATE HEPTAHYDRATE 40 MG/ML
4 INJECTION, SOLUTION INTRAVENOUS ONCE
Status: COMPLETED | OUTPATIENT
Start: 2021-03-23 | End: 2021-03-23

## 2021-03-23 RX ORDER — LIDOCAINE HYDROCHLORIDE 10 MG/ML
INJECTION, SOLUTION EPIDURAL; INFILTRATION; INTRACAUDAL; PERINEURAL CODE/TRAUMA/SEDATION MEDICATION
Status: COMPLETED | OUTPATIENT
Start: 2021-03-23 | End: 2021-03-23

## 2021-03-23 RX ORDER — FENTANYL CITRATE 50 UG/ML
INJECTION, SOLUTION INTRAMUSCULAR; INTRAVENOUS CODE/TRAUMA/SEDATION MEDICATION
Status: COMPLETED | OUTPATIENT
Start: 2021-03-23 | End: 2021-03-23

## 2021-03-23 RX ORDER — NITROGLYCERIN 20 MG/100ML
INJECTION INTRAVENOUS CODE/TRAUMA/SEDATION MEDICATION
Status: COMPLETED | OUTPATIENT
Start: 2021-03-23 | End: 2021-03-23

## 2021-03-23 RX ORDER — ONDANSETRON 2 MG/ML
4 INJECTION INTRAMUSCULAR; INTRAVENOUS ONCE
Status: DISCONTINUED | OUTPATIENT
Start: 2021-03-23 | End: 2021-03-23

## 2021-03-23 RX ORDER — SODIUM CHLORIDE 9 MG/ML
125 INJECTION, SOLUTION INTRAVENOUS CONTINUOUS
Status: CANCELLED | OUTPATIENT
Start: 2021-03-23 | End: 2021-03-23

## 2021-03-23 RX ORDER — ATORVASTATIN CALCIUM 40 MG/1
80 TABLET, FILM COATED ORAL EVERY EVENING
Status: CANCELLED | OUTPATIENT
Start: 2021-03-23

## 2021-03-23 RX ADMIN — TICAGRELOR 90 MG: 90 TABLET ORAL at 08:28

## 2021-03-23 RX ADMIN — SODIUM CHLORIDE 100 ML/HR: 0.9 INJECTION, SOLUTION INTRAVENOUS at 15:45

## 2021-03-23 RX ADMIN — ASPIRIN 81 MG: 81 TABLET, CHEWABLE ORAL at 08:27

## 2021-03-23 RX ADMIN — METOPROLOL TARTRATE 5 MG: 5 INJECTION INTRAVENOUS at 03:29

## 2021-03-23 RX ADMIN — NITROGLYCERIN 5 MCG/MIN: 20 INJECTION INTRAVENOUS at 02:28

## 2021-03-23 RX ADMIN — PANTOPRAZOLE SODIUM 40 MG: 40 TABLET, DELAYED RELEASE ORAL at 00:38

## 2021-03-23 RX ADMIN — NITROGLYCERIN 200 MCG: 20 INJECTION INTRAVENOUS at 14:59

## 2021-03-23 RX ADMIN — FENTANYL CITRATE 50 MCG: 50 INJECTION, SOLUTION INTRAMUSCULAR; INTRAVENOUS at 14:55

## 2021-03-23 RX ADMIN — METOPROLOL TARTRATE 25 MG: 25 TABLET, FILM COATED ORAL at 01:38

## 2021-03-23 RX ADMIN — ATORVASTATIN CALCIUM 80 MG: 40 TABLET, FILM COATED ORAL at 17:52

## 2021-03-23 RX ADMIN — VERAPAMIL HYDROCHLORIDE 1.25 MG: 2.5 INJECTION, SOLUTION INTRAVENOUS at 14:59

## 2021-03-23 RX ADMIN — AMLODIPINE BESYLATE 5 MG: 5 TABLET ORAL at 09:35

## 2021-03-23 RX ADMIN — MAGNESIUM SULFATE HEPTAHYDRATE 4 G: 40 INJECTION, SOLUTION INTRAVENOUS at 02:25

## 2021-03-23 RX ADMIN — CARVEDILOL 6.25 MG: 6.25 TABLET, FILM COATED ORAL at 16:53

## 2021-03-23 RX ADMIN — HEPARIN SODIUM 4000 UNITS: 1000 INJECTION INTRAVENOUS; SUBCUTANEOUS at 14:59

## 2021-03-23 RX ADMIN — LIDOCAINE HYDROCHLORIDE 1 ML: 10 INJECTION, SOLUTION EPIDURAL; INFILTRATION; INTRACAUDAL at 14:56

## 2021-03-23 RX ADMIN — HEPARIN SODIUM 11.1 UNITS/KG/HR: 10000 INJECTION, SOLUTION INTRAVENOUS at 22:52

## 2021-03-23 RX ADMIN — IOHEXOL 80 ML: 350 INJECTION, SOLUTION INTRAVENOUS at 15:20

## 2021-03-23 RX ADMIN — MIDAZOLAM HYDROCHLORIDE 2 MG: 1 INJECTION, SOLUTION INTRAMUSCULAR; INTRAVENOUS at 14:56

## 2021-03-23 RX ADMIN — METOPROLOL TARTRATE 25 MG: 25 TABLET, FILM COATED ORAL at 08:28

## 2021-03-23 NOTE — ASSESSMENT & PLAN NOTE
· BP acceptable on presentation but most recent BP elevated to 240/110  · On atenolol 50 mg BID and amlodipine 5 mg daily as an outpatient  · Give 5 mg of IV metoprolol now and PRN for SBP > 170  Also apply nitro paste  · Continue to monitor BP closely

## 2021-03-23 NOTE — DISCHARGE SUMMARY
[facility]  Discharge- Hay Ou 1945, 70 y o  female MRN: [id number]  Unit/Bed#: ICU 08 Encounter: [id number]  Primary Care Provider: Osiris Pickering DO   Date and time admitted to hospital: 3/22/2021  7:13 PM    * NSTEMI (non-ST elevated myocardial infarction) [facility])  Assessment & Plan  · Presented to ED 3/21with CP that began in the AM with associated nausea and diaphoresis, this has since resolved but she is now complaining of mild burning epigastric pain without radiation  · Troponin has remained >40 x3 since admission  · Initial EKG with ST depressions in V2, repeat EKG shows T wave inversions in V5/V6  · Cardiology consulted  · Continue ACS heparin gtt  · Loaded with Brilinta and ASA, will continue daily  · Continue statin    · PRN morphine   · Repeat EKG with increased CP   · Patient 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   Cardiology recommends transfer to [facility] for cardiac surgery evaluation for CABG     Hypertensive urgency  Assessment & Plan  · On Norvasc and Atenolol as outpatient  · Continue home norvasc, and lopressors  · Nitro gtt weaned off    SVT (supraventricular tachycardia) ([facility] [address] )  Assessment & Plan  · Per patient has hx SVT on atenolol  · On tele, she has had 2 episodes of SVT with rate 150 that have resolved without intervention   · Continue scheduled metoprolol    Elevated AST (SGOT)  Assessment & Plan  · Continue to trend     GERD (gastroesophageal reflux disease)  Assessment & Plan  · Continue PPI    Diabetes mellitus ([facility] [address] )  Assessment & Plan  Lab Results   Component Value Date    HGBA1C 6 4 (H) 03/23/2021       Recent Labs     03/23/21  0235 03/23/21  0626 03/23/21  1100   POCGLU 150* 142* 137       Blood Sugar Average: Last 72 hrs:  · (P) 143     · ISS for goal glucose 140-180               Resolved Problems  Date Reviewed: 3/24/2021    None          Admission Date:   Admission Orders (From admission, onward)     Ordered        03/22/21 2029  Inpatient Admission  Once                     Admitting Diagnosis: Chest pain [R07 9]  NSTEMI (non-ST elevated myocardial infarction) ([facility] [address] ) [I21 4]    HPI: Per admission H&P: 70 y o  female with a history of HTN, type 2 DM and GERD who presents with chest pain  Patient reports that when she awoke this morning around 730 she went to the bathroom and upon returning back to bed from the bathroom she began feeling lightheaded and then started having severe chest pain which she describes as a squeezing sensation in the center of her chest with radiation to her right arm  She reports at that time she became diaphoretic, short of breath as well as nauseous and vomited multiple times  She states that the episode lasted several minutes prior to her pain improving but notes that throughout the day she has had discomfort in her right arm which she describes as an ache and also notes intermittent headache during the day  Her daughter at bedside reports that she was monitoring the patient's blood pressure throughout the day and contacted her PCP earlier this evening  Patient reportedly did not want to come to the hospital initially but later agreed  Per ED records when EMS arrived her initial EKG showed some elevation in lead III and AVF with possible reciprocal changes in aVL  Patient currently denies chest pain but continues to note aching pain in her right arm and reports feeling diaphoretic at this time  She also reports having a headache and "reflux" which she attributes to not taking her omeprazole today  Patient reports that she has otherwise been in her usual state of health; denies fevers/ chills, cough, abdominal pain, back pain    Per patient's daughter at bedside, patient had COVID-23 last month but reportedly had only mild symptoms    Procedures Performed:   Orders Placed This Encounter   Procedures    Cardiac catheterization    Critical Care    ED ECG Documentation Only       Summary of Hospital Course: Patient admitted to the ICU   Started nitro gtt for BP management   Started on heparin gtt for concern for acute MI  Went to cath lab and found to have multivessel disease     Significant Findings, Care, Treatment and Services Provided:See above    Complications: None    Condition at Discharge: stable         Discharge instructions/Information to patient and family:   See after visit summary for information provided to patient and family  Provisions for Follow-Up Care:  See after visit summary for information related to follow-up care and any pertinent home health orders  PCP: Scottie Holliday DO    Disposition: Transfer to [address] [place] [address] for Cardiac Surgery evaluation     Planned Readmission: No    Discharge Statement   I spent 15 minutes discharging the patient  This time was spent on the day of discharge  I had direct contact with the patient on the day of discharge  Additional documentation is required if more than 30 minutes were spent on discharge  Discharge Medications:  See after visit summary for reconciled discharge medications provided to patient and family          Jenifer Vaughn PA-C

## 2021-03-23 NOTE — ASSESSMENT & PLAN NOTE
Lab Results   Component Value Date    HGBA1C 6 8 (H) 04/25/2019       No results for input(s): POCGLU in the last 72 hours  Blood Sugar Average: Last 72 hrs:     · On metformin as an outpatient, hold while inpatient  · Monitor Accu-Cheks q 6 hours while NPO  · SSI coverage  · Hypoglycemia protocol

## 2021-03-23 NOTE — ED NOTES
Patient had an episode of tachycardia HR in the 140's for a few seconds  Denies any chest pain or SOB  Patients heart rate returned to 60's  Provider made aware       Annabel David, MEGAN  03/23/21 4284

## 2021-03-23 NOTE — UTILIZATION REVIEW
Initial Clinical Review    Admission: Date/Time/Statement:   Admission Orders (From admission, onward)     Ordered        03/22/21 2029  Inpatient Admission  Once                   Orders Placed This Encounter   Procedures    Inpatient Admission     Standing Status:   Standing     Number of Occurrences:   1     Order Specific Question:   Level of Care     Answer:   Med Surg [16]     Order Specific Question:   Estimated length of stay     Answer:   More than 2 Midnights     Order Specific Question:   Certification     Answer:   I certify that inpatient services are medically necessary for this patient for a duration of greater than two midnights  See H&P and MD Progress Notes for additional information about the patient's course of treatment  ED Arrival Information     Expected Arrival Acuity Means of Arrival Escorted By Service Admission Type    - 3/22/2021 19:11 Emergent Ambulance [facility] Ambulance Critical Care/ICU Emergency    Arrival Complaint    -        Chief Complaint   Patient presents with    Chest Pain     pt states around 730 am she started w/ central CP, no radiation and "small episode of diaphoresis and nausea " pts symtpoms improving since this morning  65 asa given      Assessment/Plan: 71 yo female PMHx HTN, type 2 DM and GERD to ED by EMS admitted Inpatient due to NSTEMI type 1  Reports waking with chest pain, went to BR then returned to bed feeling lightheaded w severe CP starting as a squeezing sensation centrally w radiation to right arm w diaphoresis, SOB, nausea & multiple vomiting episodes  Episode lasted several minutes w pain improving but continuing w discomfort in right arm w intermittent HA during day  Per EMS arrival EKG some elevation in lead III and AVF with possible reciprocal changes in aVL  IN ED: patient denies CP but reports ache in right arm w diaphoresis, EKG on arrival NSR with frequent PVCs and ST depression in aVL  Consult Cardiology   Load with Rex & continue, ASA, IV Heparin drip Notify w any CP & repeat EKG, NC prn pain management, obtain ECHO  & lipid panel  Cont statin, repeat CMP  Give 5 mg IV Metoprolol & prn for SBP>170, monitor BP  3/23/2021 Physician assistant  Notified by nursing that patient had a brief episode of tachycardia with HR in the 140s followed by HR returning to the 60s  Repeat EKG obtained with prolonged QTc noted  Patient without complaints of chest pain and but continues to note "burning" in her chest per nursing  In regards to the patient's BP, most recent BP has improved to 180/118 after 5 mg of IV metoprolol and nitro paste  Updated on-call cardiologist with the above who recommended discontinuing atenolol and changing patient to metoprolol 25 mg q8h starting now and also recommended starting patient on a IV nitro drip  Transfer to ICU   3/23/2021 Critical care Provider  Wean Nitro drip, cath lab for NSTEMI  Obtain echo, ASA, Brilinta, heparin, hold Metformin; NPO  Prolonged QTc noted in 2017 and now on repeat EKG - will avoid any medication that could further prolong, replace electrolytes  3/23/2021 Cardiology NSTEMI type 1 suspect late presentation MI  12 lead ECG 3/22; NSR, LVH criteria, T-wave inversions in the lateral leads, trop elevations x4 >40  Cont ongoing IV Heparin, IV Nitro, ASA, statin, & BB, loaded Brilinta & currently Q12 HR  Metoprol tartrate 25 mg TID & IV nitro infusion  24 hour telemetry review; predominant NSR, brief episodes of paroxysmal SVT last evening  Replete & monitor potassium & MAG  Obtain TTE, LHC for definitive ischemic eval   Cont IV Heparin, DC IV nitro add Norvasc 5 mg daily  Adjust BB & utilize Coreg BID to start today  Cont ASA, atorvastatin  Cont telemetry  3/23 PM update Attending  S/P  cardiac cath today which showed multivessel disease   Cardiology recommends transfer to [facility] for cardiac surgery evaluation for CABG   ED Triage Vitals   Temperature Pulse Respirations Blood Pressure SpO2 03/22/21 2345 03/22/21 1915 03/22/21 1915 03/22/21 1917 03/22/21 1915   98 °F (36 7 °C) 90 18 146/92 94 %      Temp Source Heart Rate Source Patient Position - Orthostatic VS BP Location FiO2 (%)   03/22/21 2345 03/22/21 1915 03/22/21 2045 03/22/21 2045 --   Oral Monitor Lying Right arm       Pain Score       03/22/21 1915       1          Wt Readings from Last 1 Encounters:   03/23/21 97 1 kg (214 lb 1 1 oz)     Additional Vital Signs:   Date/Time  Temp  Pulse  Resp  BP  MAP (mmHg)  SpO2  Calculated FIO2 (%) - Nasal Cannula  Nasal Cannula O2 Flow Rate (L/min)  O2 Device  Patient Position - Orthostatic VS   03/23/21 1300  --  58  26Abnormal   149/68  98  96 %  --  --  --  --   03/23/21 1000  --  64  22  164/76  109  98 %  --  --  --  --   03/23/21 0930  --  61  18  172/81Abnormal   117  94 %  --  --  --  --   03/23/21 0900  --  73  20  145/71  101  98 %  --  --  --  --   03/23/21 0845  --  64  20  156/80  112  99 %  --  --  --  --   03/23/21 0830  --  75  34Abnormal   169/74  107  99 %  --  --  --  --   03/23/21 0815  --  72  26Abnormal   138/67  97  98 %  --  --  --  --   03/23/21 0800  --  69  22  151/62  99  98 %  --  --  --  --   03/23/21 0745  --  75  24Abnormal   169/77  110  98 %  --  --  --  --   03/23/21 0730  --  69  25Abnormal   172/79Abnormal   114  97 %  --  --  --  --   03/23/21 0715  --  72  18  156/75  108  97 %  --  --  --  --   03/23/21 0700  98 6 °F (37 °C)  74  27Abnormal   167/81  115  97 %  28  2 L/min  Nasal cannula  --   03/23/21 0624  --  74  25Abnormal   197/98Abnormal   135  97 %  --  --  --  --   03/23/21 0604  --  74  23Abnormal   182/91Abnormal   129  97 %  --  --  --  --   03/23/21 0534  --  74  33Abnormal   181/91Abnormal   128  97 %  --  --  --  --   03/23/21 0519  --  74  19  190/93Abnormal   131  96 %  --  --  --  --   03/23/21 0500  --  75  25Abnormal   175/97Abnormal   127  97 %  --  --  --  --   03/23/21 0434  --  77  26Abnormal   166/94  122  97 %  --  --  --  --   03/23/21 0422  --  75  28Abnormal   167/92  122  96 %  --  --  --  --   03/23/21 0404  --  78  22  175/91Abnormal   122  95 %  --  --  --  --   03/23/21 0400  --  77  26Abnormal   175/91Abnormal   122  96 %  --  --  --  --   03/23/21 0351  --  82  28Abnormal   181/105Abnormal   133  95 %  --  --  --  --   03/23/21 0338  --  87  22  219/109Abnormal   151  95 %  --  --  --  --   03/23/21 0319  --  82  20  197/110Abnormal   146  96 %  28  2 L/min  Nasal cannula  Lying   03/23/21 0256  --  79  20  180/94Abnormal   --  95 %  28  2 L/min  Nasal cannula  Lying   03/23/21 0252  --  80  20  203/100Abnormal   --  95 %  28  2 L/min  Nasal cannula  Lying   03/23/21 0245  --  80  20  198/108Abnormal   146  96 %  28  2 L/min  Nasal cannula  Lying   03/23/21 0237  --  90  20  190/111Abnormal   --  96 %  28  2 L/min  Nasal cannula  Lying   03/23/21 0230  --  68  20  197/94Abnormal   134  97 %  28  2 L/min  Nasal cannula  Lying   03/23/21 0215  --  68  20  208/96Abnormal   138  98 %  28  2 L/min  Nasal cannula  Lying   03/23/21 0145  --  68  18  223/99Abnormal   --  97 %  28  2 L/min  Nasal cannula  Lying   03/23/21 0138  --  83  --  219/109Abnormal   --  --  --  --  --  --   03/23/21 0045  --  64  18  210/92Abnormal   132  98 %  28  2 L/min  Nasal cannula  Lying   03/23/21 0030  --  60  --  193/89Abnormal   --  97 %  --  --  None (Room air)  Lying   03/23/21 0000  --  60  18  180/118Abnormal   144  95 %  --  --  None (Room air)  Lying   03/22/21 2345  98 °F (36 7 °C)  62  18  215/97Abnormal   139  94 %  --  --  None (Room air)  Lying   03/22/21 2245  --  64  18  224/110Abnormal   158  95 %  --  --  None (Room air)  Lying   03/22/21 2152  --  --  --  240/110Abnormal   --  97 %  --  --  None (Room air)  --   03/22/21 2045  --  64  18  209/93Abnormal   133  93 %  --  --  None (Room air)  Lying   03/22/21 2030  --  66  --  --  --  94 %  --  --  --  --   03/22/21 1917  --  --  --  146/92  --  --  --  --  --  --   03/22/21 1915  --  90  18  -- --  94 %  --  --  None (Room air)  --      Weights (last 14 days)    Date/Time  Weight  Weight Method  Height   03/23/21 0600  97 1 kg (214 lb 1 1 oz)  Bed scale  --   03/22/21 2345  --  --  5' 2" (1 575 m)   03/22/21 1915  97 4 kg (214 lb 11 7 oz)  Bed scale  --       Pertinent Labs/Diagnostic Test Results:       Results from last 7 days   Lab Units 03/23/21  0406 03/22/21 1919   WBC Thousand/uL 10 94* 9 91   HEMOGLOBIN g/dL 12 3 13 4   HEMATOCRIT % 37 5 42 2   PLATELETS Thousands/uL 195 216   NEUTROS ABS Thousands/µL  --  7 34         Results from last 7 days   Lab Units 03/23/21  0406 03/23/21 0052 03/22/21 2243 03/22/21 1919   SODIUM mmol/L 138  --  138 140   POTASSIUM mmol/L 4 0  --  4 3 4 7   CHLORIDE mmol/L 100  --  104 100   CO2 mmol/L 25  --  24 28   ANION GAP mmol/L 13  --  10 12   BUN mg/dL 12  --  13 14   CREATININE mg/dL 0 89  --  0 92 0 97   EGFR ml/min/1 73sq m 65  --  62 58   CALCIUM mg/dL 8 2*  --  8 8 8 9   MAGNESIUM mg/dL 1 6 0 9*  --   --    PHOSPHORUS mg/dL  --  4 1  --   --      Results from last 7 days   Lab Units 03/23/21 0406 03/22/21 2243 03/22/21 1919   AST U/L 444* 340* 289*   ALT U/L 60 48 48   ALK PHOS U/L 55 55 63   TOTAL PROTEIN g/dL 7 1 7 2 8 0   ALBUMIN g/dL 3 4* 3 5 3 8   TOTAL BILIRUBIN mg/dL 0 54 0 41 0 40     Results from last 7 days   Lab Units 03/23/21  1100 03/23/21  0626 03/23/21  0235   POC GLUCOSE mg/dl 137 142* 150*     Results from last 7 days   Lab Units 03/23/21  0406 03/22/21 2243 03/22/21 1919   GLUCOSE RANDOM mg/dL 139 127 124         Results from last 7 days   Lab Units 03/23/21 0406   HEMOGLOBIN A1C % 6 4*   EAG mg/dl 137     No results found for: BETA-HYDROXYBUTYRATE                   Results from last 7 days   Lab Units 03/23/21  0818 03/23/21  0404 03/23/21  0055 03/22/21  2243 03/22/21  1919   TROPONIN I ng/mL >40 00* >40 00* >40 00* >40 00* >40 00*         Results from last 7 days   Lab Units 03/23/21  0818 03/23/21  0221 03/22/21  1919   PROTIME seconds  --   --  12 8   INR   --   --  0 95   PTT seconds 71* 74* 28     Results from last 7 days   Lab Units 03/23/21  0406   TSH 3RD GENERATON uIU/mL 1 563         Results from last 7 days   Lab Units 03/23/21  0052   NT-PRO BNP pg/mL 5,102*     XR chest 1 view portable   Final Result by , MD (03/23 0323)      No focal consolidation  3/22/2021 12 lead ECG 3/22; NSR, LVH criteria, T-wave inversions in the lateral leads  3/23/2021 Cardiac cath   CORONARY CIRCULATION:  Left main: Normal   LAD: The vessel was normal sized  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, involving the bifurcation  There was a 70% LAD stenosis just proximal to the aneurysm  There was an 80% D1 ostial stenosis at its origin  from the aneurysmal area  Circumflex: The vessel was normal sized  There was 90% distal stenosis just before the origin of the large OM2 branch  RCA: The vessel was medium sized and dominant, giving rise to the PDA  There was a 70% stenosis in mid vessel  Summary:  3 vessel CAD  Large aneurysm of prox LAD  Plan: sugical consultation  3/23/2021 ECHO=SUMMARY  PROCEDURE INFORMATION:  This was a technically difficult study  LEFT VENTRICLE:  Size was normal   Systolic function was at the lower limits of normal  Ejection fraction was estimated to be 50 %  There was severe hypokinesis of the basal-mid inferior and basal-mid inferolateral wall(s)  Doppler parameters were consistent with abnormal left ventricular relaxation (grade 1 diastolic dysfunction)  MITRAL VALVE:  There was mild annular calcification  There was mild regurgitation  AORTIC VALVE:  The valve was trileaflet  Leaflets exhibited mild calcification, lower normal cuspal separation, and sclerosis  The peak valve velocity was 179 cm/s  Valve mean gradient was 6 mmHg  TRICUSPID VALVE:  There was trace regurgitation    ED Treatment:   Medication Administration from 03/22/2021 1911 to 03/23/2021 9328       Date/Time Order Dose Route Action     03/22/2021 1935 heparin (porcine) injection 4,000 Units 4,000 Units Intravenous Given     03/22/2021 1937 heparin (porcine) 25,000 units in 0 45% NaCl 250 mL infusion (premix) 11 1 Units/kg/hr Intravenous New Bag     03/22/2021 2031 ticagrelor (BRILINTA) tablet 180 mg 180 mg Oral Given     03/22/2021 2231 metoprolol (LOPRESSOR) injection 5 mg 5 mg Intravenous Given     03/22/2021 2233 nitroglycerin (NITRO-BID) 2 % TD ointment 1 inch 1 inch Topical Given     03/23/2021 0038 pantoprazole (PROTONIX) EC tablet 40 mg 40 mg Oral Given     03/23/2021 0138 metoprolol tartrate (LOPRESSOR) tablet 25 mg 25 mg Oral Given     03/23/2021 0252 nitroGLYcerin (TRIDIL) 50 mg in 250 mL infusion (premix) 15 mcg/min Intravenous Rate/Dose Change     03/23/2021 0238 nitroGLYcerin (TRIDIL) 50 mg in 250 mL infusion (premix) 10 mcg/min Intravenous Rate/Dose Change     03/23/2021 0228 nitroGLYcerin (TRIDIL) 50 mg in 250 mL infusion (premix) 5 mcg/min Intravenous New Bag     03/23/2021 0225 magnesium sulfate 4 g/100 mL IVPB (premix) 4 g 4 g Intravenous New Bag        Past Medical History:   Diagnosis Date    Diabetes mellitus ([facility] 75 )     Hypertension      Admitting Diagnosis: Chest pain [R07 9]  [facility]STEM[facility] (non-ST elevated [facility] [I21 4]  Age/Sex: 70 y o  female  Admission Orders:  Telemetry  Obtain echo   Npo  Cardiac cath  scd  NC  Transfer to [facility] for surgical consultation eval  Scheduled Medications:  amLODIPine, 5 mg, Oral, Daily  aspirin, 81 mg, Oral, Daily  atorvastatin, 80 mg, Oral, QPM  carvedilol, 6 25 mg, Oral, BID With Meals  insulin lispro, 1-6 Units, Subcutaneous, Q6H PREET  pantoprazole, 40 mg, Oral, Early Morning  ticagrelor, 90 mg, Oral, Q12H [facility]    Continuous IV Infusions:  heparin (porcine), 3-20 Units/kg/hr (Order-Specific), Intravenous, Titrated    PRN Meds:  morphine injection, 2 mg, Intravenous, Q6H PRN      IP CONSULT TO CARDIOLOGY  Network Utilization Review Department  ATTENTION: Please call with any questions or concerns to [phone] and carefully listen to the prompts so that you are directed to the right person  All voicemails are confidential   [employer]eling all requests for admission clinical reviews, approved or denied determinations and any other requests to dedicated fax number below belonging to the campus where the patient is receiving treatment   List of dedicated fax numbers for the Facilities:  [address] DENIALS (Administrative/Medical Necessity) [phone]   [address] (Maternity/NICU/Pediatrics) [phone]   [address] Natalya Mcnamara 3753 (  Niyah Urena "[place]" 103) [address] [address] Valentino Wolf 1481 [address] [facility]) [address] [place] [address] [phone]

## 2021-03-23 NOTE — ASSESSMENT & PLAN NOTE
· AST elevated to 289 on admission  · ALT, alk phos and T bili within normal limits  · On review of prior lab work from [facility], AST was up to 46 in 2018 but otherwise no prior elevation in LFTs noted  · Obtain repeat CMP to verify  · Continue statin for now unless remains elevated

## 2021-03-23 NOTE — ASSESSMENT & PLAN NOTE
· On Norvasc and Atenolol as outpatient  · Continue home norvasc, and lopressors  · Nitro gtt weaned off

## 2021-03-23 NOTE — ASSESSMENT & PLAN NOTE
Lab Results   Component Value Date    HGBA1C 6 4 (H) 03/23/2021       Recent Labs     03/23/21  0235 03/23/21  0626 03/23/21  1100   POCGLU 150* 142* 137       Blood Sugar Average: Last 72 hrs:  · (P) 143     · ISS for goal glucose 140-180

## 2021-03-23 NOTE — ASSESSMENT & PLAN NOTE
· Presented to ED last evening with CP that began yesterday AM with associated nausea and diaphoresis, this has since resolved but she is now complaining of mild burning epigastric pain without radiation  · Troponin has remained >40 x3 since admission  · Initial EKG with ST depressions in V2, repeat EKG shows T wave inversions in V5/V6  · Cardiology consulted, appreciate recommendations, will transfer to SD1 for initiation of Nitro gtt   · Continue ACS heparin gtt  · Loaded with Brilinta and ASA, will continue daily  · Continue statin    · PRN morphine   · Repeat EKG with increased CP   · ECHO ordered

## 2021-03-23 NOTE — CONSULTS
[facility]  ICU Acceptance Note -  Osmar Joseph 1945, 70 y o  female MRN: [id number]  Unit/Bed#: ICU 08 Encounter: [id number]  Primary Care Provider: April Breen DO   Date and time admitted to hospital: 3/22/2021  7:13 PM      * NSTEMI (non-ST elevated myocardial infarction) [facility])  Assessment & Plan  · Presented to ED last evening with CP that began yesterday AM with associated nausea and diaphoresis, this has since resolved but she is now complaining of mild burning epigastric pain without radiation  · Troponin has remained >40 x3 since admission  · Initial EKG with ST depressions in V2, repeat EKG shows T wave inversions in V5/V6  · Cardiology consulted, appreciate recommendations, will transfer to [facility] for initiation of Nitro gtt   · Continue ACS heparin gtt  · Loaded with Brilinta and ASA, will continue daily  · Continue statin    · PRN morphine   · Repeat EKG with increased CP   · ECHO ordered    Hypertensive urgency  Assessment & Plan  · On Norvasc and Atenolol as outpatient  · Continue home norvasc, will hold home atenolol and start metoprolol 25mg Q6H per cardiology   · Will also start Nitro gtt, goal SBP<180     SVT (supraventricular tachycardia) ([facility] [address] )  Assessment & Plan  · Per patient has hx SVT on atenolol  · On tele, she has had 2 episodes of SVT with rate 150 that have resolved without intervention   · Continue scheduled metoprolol, hold home atenolol    Elevated AST (SGOT)  Assessment & Plan  · Unclear etiology, continue to trend  · Consider RUQ US if continuing to trend upward    GERD (gastroesophageal reflux disease)  Assessment & Plan  · Continue PPI    Diabetes mellitus ([facility] [address] )  Assessment & Plan  Lab Results   Component Value Date    HGBA1C 6 8 (H) 04/25/2019       No results for input(s): POCGLU in the last 72 hours      Blood Sugar Average: Last 72 hrs:  · Continue Q6H glucose checks and SSI for goal glucose 140-180 -------------------------------------------------------------------------------------------------------------  Chief Complaint: NSTEMI    History of Present Illness   HX and PE limited by: N/A  Yaw Gore is a 70 y o  female who presents with chest pain that began yesterday morning  She has PMH DM2, HTN, HLD, and SVT  Per the patient, she had chest pain with associated nausea and diaphoresis that lasted throughout the day yesterday prompting her to come to the ED  In the ED she was found to have ST depressions in V2 and troponin>40  Chest pain resolved without intervention  She was given ASA, Brilinta, and started on heparin infusion  She was admitted to internal medicine  Throughout the evening she became hypertensive requiring IV lopressor and nitro paste  She subsequently developed burning/nonradiating epigastric pain, EKG showed new T wave inversions in V5/V6  She continued to be hypertensive with SBP>200  Internal medicine touched base with cardiology who recommended Nitro infusion for management of pain and HTN  She will be transferred to [facility] for further monitoring and management  History obtained from chart review and the patient   -------------------------------------------------------------------------------------------------------------  Dispo: Transfer to Stepdown Level 1     Code Status: Level 1 - Full Code  --------------------------------------------------------------------------------------------------------------  Review of Systems   Constitutional: Negative  HENT: Negative  Eyes: Negative  Respiratory: Negative for cough, chest tightness and shortness of breath  Cardiovascular: Positive for chest pain  Gastrointestinal: Negative  Endocrine: Negative  Genitourinary: Negative  Musculoskeletal: Negative  Skin: Negative  Allergic/Immunologic: Negative  Neurological: Negative  Hematological: Negative  Psychiatric/Behavioral: Negative          A 12-point, complete review of systems was reviewed and negative except as stated above     Physical Exam  Constitutional:       General: She is not in acute distress  Appearance: Normal appearance  She is not toxic-appearing  HENT:      Head: Normocephalic and atraumatic  Nose: Nose normal       Mouth/Throat:      Mouth: Mucous membranes are moist    Eyes:      Pupils: Pupils are equal, round, and reactive to light  Neck:      Musculoskeletal: Neck supple  Cardiovascular:      Rate and Rhythm: Normal rate and regular rhythm  Pulses: Normal pulses  Heart sounds: Normal heart sounds  Pulmonary:      Effort: Pulmonary effort is normal  No respiratory distress  Breath sounds: No wheezing or rhonchi  Abdominal:      General: Bowel sounds are normal  There is no distension  Palpations: Abdomen is soft  Tenderness: There is no abdominal tenderness  Musculoskeletal: Normal range of motion  Right lower leg: Edema present  Left lower leg: Edema present  Skin:     General: Skin is warm and dry  Neurological:      General: No focal deficit present  Mental Status: She is alert and oriented to person, place, and time  Cranial Nerves: No cranial nerve deficit  Sensory: No sensory deficit  Motor: No weakness  Psychiatric:         Mood and Affect: Mood normal        --------------------------------------------------------------------------------------------------------------  Vitals:   Vitals:    03/23/21 0338 03/23/21 0351 03/23/21 0400 03/23/21 0422   BP: (!) 219/109 (!) 181/105 (!) 175/91 167/92   BP Location:       Pulse: 87 82 77 75   Resp: 22 (!) 28 (!) 26 (!) 28   Temp:       TempSrc:       SpO2: 95% 95% 96% 96%   Weight:       Height:         Temp  Min: 98 °F (36 7 °C)  Max: 98 °F (36 7 °C)  IBW: 50 1 kg  Height: 5' 2" (157 5 cm)  Body mass index is 39 27 kg/m²      Laboratory and Diagnostics:  Results from last 7 days   Lab Units 03/23/21  0400 03/22/21 1919   WBC Thousand/uL 10 94* 9 91   HEMOGLOBIN g/dL 12 3 13 4   HEMATOCRIT % 37 5 42 2   PLATELETS Thousands/uL 195 216   NEUTROS PCT %  --  74   MONOS PCT %  --  5     Results from last 7 days   Lab Units 03/22/21 2243 03/22/21 1919   SODIUM mmol/L 138 140   POTASSIUM mmol/L 4 3 4 7   CHLORIDE mmol/L 104 100   CO2 mmol/L 24 28   ANION GAP mmol/L 10 12   BUN mg/dL 13 14   CREATININE mg/dL 0 92 0 97   CALCIUM mg/dL 8 8 8 9   GLUCOSE RANDOM mg/dL 127 124   ALT U/L 48 48   AST U/L 340* 289*   ALK PHOS U/L 55 63   ALBUMIN g/dL 3 5 3 8   TOTAL BILIRUBIN mg/dL 0 41 0 40     Results from last 7 days   Lab Units 03/23/21  0052   MAGNESIUM mg/dL 0 9*      Results from last 7 days   Lab Units 03/23/21  0221 03/22/21 1919   INR   --  0 95   PTT seconds 74* 28      Results from last 7 days   Lab Units 03/23/21  0055 03/22/21 2243 03/22/21 1919   TROPONIN I ng/mL >40 00* >40 00* >40 00*         ABG:    VBG:          Micro:        EKG: NSR rate 85, V2 ST depression, T wave inversion V5/V6  Imaging: I have personally reviewed pertinent reports  and I have personally reviewed pertinent films in PACS      Historical Information   Past Medical History:   Diagnosis Date    Diabetes mellitus ([facility] [address] )     Hypertension      History reviewed  No pertinent surgical history    Social History   Social History     Substance and Sexual Activity   Alcohol Use Not Currently     Social History     Substance and Sexual Activity   Drug Use Not Currently     Social History     Tobacco Use   Smoking Status Never Smoker       Family History:   Family History   Problem Relation Age of Onset    Heart attack Sister     Breast cancer Sister     Heart attack Brother            Medications:  Current Facility-Administered Medications   Medication Dose Route Frequency    aspirin chewable tablet 81 mg  81 mg Oral Daily    atorvastatin (LIPITOR) tablet 80 mg  80 mg Oral QPM    heparin (porcine) 25,000 units in 0 45% NaCl 250 mL infusion (premix)  3-20 Units/kg/hr (Order-Specific) Intravenous Titrated    insulin lispro (HumaLOG) 100 units/mL subcutaneous injection 1-6 Units  1-6 Units Subcutaneous Q6H [address]    magnesium sulfate 4 g/100 mL IVPB (premix) 4 g  4 g Intravenous Once    metoprolol tartrate (LOPRESSOR) tablet 25 mg  25 mg Oral Q8H    morphine injection 2 mg  2 mg Intravenous Q6H PRN    nitroGLYcerin (TRIDIL) 50 mg in 250 mL infusion (premix)  5-200 mcg/min Intravenous Titrated    pantoprazole (PROTONIX) EC tablet 40 mg  40 mg Oral Early Morning    ticagrelor (BRILINTA) tablet 90 mg  90 mg Oral Q12H [address]     Home medications:  Prior to Admission Medications   Prescriptions Last Dose Informant Patient Reported? Taking? Omeprazole 20 MG TBEC 3/21/2021 at Unknown time  Yes Yes   Sig: Take 20 mg by mouth daily   atenolol (TENORMIN) 50 mg tablet 3/21/2021 at Unknown time  Yes Yes   Sig: Take 50 mg by mouth 2 (two) times a day   atorvastatin (LIPITOR) 80 mg tablet 3/21/2021 at Unknown time  Yes Yes   Sig: Take 80 mg by mouth daily   metFORMIN (GLUCOPHAGE) 850 mg tablet 3/21/2021 at Unknown time  Yes Yes   Sig: Take 850 mg by mouth Three times a day      Facility-Administered Medications: None     Allergies:  No Known Allergies  ------------------------------------------------------------------------------------------------------------  Advance Directive and Living Will:      Power of :    POLST:    ------------------------------------------------------------------------------------------------------------  Care Time Delivered:   Upon my evaluation, this patient had a high probability of imminent or life-threatening deterioration due to NSTEMI, hypertensive urgency , which required my direct attention, intervention, and personal management  I have personally provided 35 minutes ([phone] of critical care time, exclusive of procedures, teaching, family meetings, and any prior time recorded by providers other than myself  SHMUEL Nugent        Portions of the record may have been created with voice recognition software  Occasional wrong word or "sound a like" substitutions may have occurred due to the inherent limitations of voice recognition software    Read the chart carefully and recognize, using context, where substitutions have occurred

## 2021-03-23 NOTE — CONSULTS
Cardiology   Blanca Gutierrez 70 y o  female MRN: [id number]  Unit/Bed#: ICU 08 Encounter: [id number]      Reason for Consult / Principal Problem: Elevated troponin    Physician Requesting Consult:  Lauren Gilman MD    Cardiologist:     Assessment  1  NSTEMI type 1, suspect late presentation MI   -Currently without any complaints of chest pain  -12 lead ECG 3/22; NSR, LVH criteria, T-wave inversions in the lateral leads  -Troponin elevations x 4 >40  -On IV heparin infusion -- ACS low  -On IV nitroglycerin GTT at 20 mcg/min  -On aspirin, statin, and BB  -Loaded with Brilinta 180 mg yesterday, and currently on 90 mg q 12 hours    2  Accelerated hypertension  -/93 on admission, HR 64  -BP last recorded at 156/80, HR 64  -Outpatient BP regimen; atenolol 50 mg b i d   -Inpatient the regimen; metoprolol tartrate 25 mg t i d , and IV nitroglycerin infusion    3  Paroxysmal SVT  -History of  -24 hour telemetry review; predominant NSR, brief episodes of paroxysmal SVT last evening  -Electrolytes (K +4 3, Mag 0 9) --on admission -- mag level was repleted  -Previously on atenolol 50 mg b i d (on hold)  -Currently on metoprolol tartrate 25 mg t i d     4  Dyslipidemia  -Lipid profile 3/23/2021; cholesterol 195, triglycerides 169, HDL 51, and   -On atorvastatin 80 mg daily    5  DM type 2  -No recent HGB A1c; was 6 8 in 4/2019  -On SSI coverage; routine glucose monitor    6   Plan  -Obtain TTE  -[facility] for definitive ischemic evaluation  -Continue IV heparin infusion -- ACS low  -DC IV nitro GTT, will add Norvasc 5 mg daily to regimen  -Will adjust BB and utilize Coreg 6 25 mg BID dose to start this afternoon  -Continue aspirin 81 mg daily, and atorvastatin 80 mg daily  -Monitor renal function and electrolytes closely -- replete to maintain K +4 0, and Mag of 2 0  -Add TSH/free t4 level, and HGBa1c  -Monitor on telemetry    HPI: Blanca Gutierrez 70y o  year old female with a medical history of hypertension, dyslipidemia , paroxysmal supraventricular tachycardia, DM type 2, and obesity  No prior history of CAD, or CHF  Former smoker (20-25 years 1 to 1 5 ppd, quit about 4 years ago), denies excessive alcohol or recreational drug use  She does not routinely follow with a cardiologist     At baseline patient is very sedentary  She has otherwise been in her usual state of health  She denies having been sick recently  Patient stated yesterday morning she woke up around 7:00 a m , after walking back from the bathroom she began to feel very dizzy, hot/flushed, and diaphoretic  She then began to experience midsternal chest pressure with radiation down her right arm  She then began to experience profound nausea and vomiting  Her symptoms of chest pressure had resolved after about 10 minutes, but throughout the morning time she still felt dizzy, hot/flushed and diaphoretic with some residual pain in her right arm  Patient's daughter had reached out to her mother's PCP who advised her to go to the ED for further evaluation, however the patient was reluctant to do so initially  She had summoned EMS late yesterday evening after having been convinced by her family to come into the ED for further evaluation of her symptoms  She received 324 mg of aspirin in route by EMS  In the ED she was found to be severely hypertensive with blood pressures in the 200-240 range  Laboratory data had demonstrated an unremarkable BMP with the exception of an elevated AST of 289, CBC unremarkable  Initial troponin level was elevated at >40, and subsequent levels have all remained elevated > 40  Her NT proBNP was elevated at 5102  Initial 12 lead ECG demonstrated NSR, possible LVH, and lateral ST depressions  She was initiated on a IV heparin infusion ACS low and was loaded with 180 mg of Brilinta  Cardiology was consulted for further treatment recommendations/management    Family History:   Family History   Problem Relation Age of Onset    Heart attack Sister     Breast cancer Sister     Heart attack Brother      Historical Information   Past Medical History:   Diagnosis Date    Diabetes mellitus ([address] )     Hypertension      History reviewed  No pertinent surgical history  Social History   Social History     Substance and Sexual Activity   Alcohol Use Not Currently     Social History     Substance and Sexual Activity   Drug Use Not Currently     Social History     Tobacco Use   Smoking Status Never Smoker     Family History:   Family History   Problem Relation Age of Onset    Heart attack Sister     Breast cancer Sister     Heart attack Brother        Review of Systems:  Review of Systems   Constitutional: Positive for fatigue  Respiratory: Negative for cough, chest tightness and shortness of breath  Cardiovascular: Negative for chest pain, palpitations and leg swelling  Gastrointestinal: Negative for abdominal pain  Neurological: Positive for headaches  Negative for dizziness and light-headedness  All other systems reviewed and are negative            Scheduled Meds:  Current Facility-Administered Medications   Medication Dose Route Frequency Provider Last Rate    aspirin  81 mg Oral Daily SHMUEL Peralta      atorvastatin  80 mg Oral QPM SHMUEL Peralta      heparin (porcine)  3-20 Units/kg/hr (Order-Specific) Intravenous Titrated Saskia Medicine, CRNP 11 1 Units/kg/hr (03/22/21 1937)    insulin lispro  1-6 Units Subcutaneous Q6H [facility] SHMUEL Peralta      metoprolol tartrate  25 mg Oral Q8H SHMUEL Peralta      morphine injection  2 mg Intravenous Q6H PRN Saskia Medicine, SHMUEL      nitroGLYcerin  5-200 mcg/min Intravenous Titrated Saskia Medicine, CRNP 25 mcg/min (03/23/21 0818)    pantoprazole  40 mg Oral Early Morning Saskia Medicine, CRNP      ticagrelor  90 mg Oral Q12H [facility] SHMUEL Peralta       Continuous Infusions:heparin (porcine), 3-20 Units/kg/hr (Order-Specific), Last Rate: 11 1 Units/kg/hr (03/22/21 1937)  nitroGLYcerin, 5-200 mcg/min, Last Rate: 25 mcg/min (03/23/21 0818)      PRN Meds: morphine injection  all current active meds have been reviewed and current meds:   Current Facility-Administered Medications   Medication Dose Route Frequency    aspirin chewable tablet 81 mg  81 mg Oral Daily    atorvastatin (LIPITOR) tablet 80 mg  80 mg Oral QPM    heparin (porcine) 25,000 units in 0 45% NaCl 250 mL infusion (premix)  3-20 Units/kg/hr (Order-Specific) Intravenous Titrated    insulin lispro (HumaLOG) 100 units/mL subcutaneous injection 1-6 Units  1-6 Units Subcutaneous Q6H [address]    metoprolol tartrate (LOPRESSOR) tablet 25 mg  25 mg Oral Q8H    morphine injection 2 mg  2 mg Intravenous Q6H PRN    nitroGLYcerin (TRIDIL) 50 mg in 250 mL infusion (premix)  5-200 mcg/min Intravenous Titrated    pantoprazole (PROTONIX) EC tablet 40 mg  40 mg Oral Early Morning    ticagrelor (BRILINTA) tablet 90 mg  90 mg Oral Q12H [address]       No Known Allergies    Objective   Vitals: Blood pressure 167/81, pulse 74, temperature 98 6 °F (37 °C), temperature source Axillary, resp  rate (!) 27, height 5' 2" (1 575 m), weight 97 1 kg (214 lb 1 1 oz), SpO2 97 %  , Body mass index is 39 15 kg/m² ,   Orthostatic Blood Pressures      Most Recent Value   Blood Pressure  167/81 filed at 03/23/2021 0700   Patient Position - Orthostatic VS  Lying filed at 03/23/2021 0319            Intake/Output Summary (Last 24 hours) at 3/23/2021 0826  Last data filed at 3/23/2021 5997  Gross per 24 hour   Intake 226 43 ml   Output 650 ml   Net -423 57 ml       Invasive Devices     Peripheral Intravenous Line            Peripheral IV 03/22/21 Right Antecubital less than 1 day    Peripheral IV 03/23/21 Left Forearm less than 1 day    Peripheral IV 03/23/21 Right Wrist less than 1 day              Physical Exam:  Physical Exam  Vitals signs and nursing note reviewed  Constitutional:       General: She is not in acute distress       Appearance: Normal appearance  She is obese  She is not ill-appearing  HENT:      Head: Normocephalic and atraumatic  Mouth/Throat:      Mouth: Mucous membranes are moist    Eyes:      General: No scleral icterus  Neck:      Musculoskeletal: Normal range of motion and neck supple  Comments: No JVD  Cardiovascular:      Rate and Rhythm: Normal rate and regular rhythm  Pulses: Normal pulses  Heart sounds: Normal heart sounds  No murmur  Pulmonary:      Effort: Pulmonary effort is normal       Breath sounds: Normal breath sounds  No wheezing or rales  Abdominal:      Palpations: Abdomen is soft  Musculoskeletal:      Right lower leg: No edema  Left lower leg: No edema  Skin:     General: Skin is warm and dry  Capillary Refill: Capillary refill takes less than 2 seconds  Coloration: Skin is pale  Neurological:      General: No focal deficit present  Mental Status: She is alert and oriented to person, place, and time     Psychiatric:         Mood and Affect: Mood normal          Lab Results:   Recent Results (from the past 24 hour(s))   CBC and differential    Collection Time: 03/22/21  7:19 PM   Result Value Ref Range    WBC 9 91 4 31 - 10 16 Thousand/uL    RBC 4 64 3 81 - 5 12 Million/uL    Hemoglobin 13 4 11 5 - 15 4 g/dL    Hematocrit 42 2 34 8 - 46 1 %    MCV 91 82 - 98 fL    MCH 28 9 26 8 - 34 3 pg    MCHC 31 8 31 4 - 37 4 g/dL    RDW 15 0 11 6 - 15 1 %    MPV 10 1 8 9 - 12 7 fL    Platelets 164 084 - 959 Thousands/uL    nRBC 0 /100 WBCs    Neutrophils Relative 74 43 - 75 %    Immat GRANS % 1 0 - 2 %    Lymphocytes Relative 20 14 - 44 %    Monocytes Relative 5 4 - 12 %    Eosinophils Relative 0 0 - 6 %    Basophils Relative 0 0 - 1 %    Neutrophils Absolute 7 34 1 85 - 7 62 Thousands/µL    Immature Grans Absolute 0 06 0 00 - 0 20 Thousand/uL    Lymphocytes Absolute 1 99 0 60 - 4 47 Thousands/µL    Monocytes Absolute 0 47 0 17 - 1 22 Thousand/µL    Eosinophils Absolute 0 03 0 00 - 0 61 Thousand/µL    Basophils Absolute 0 02 0 00 - 0 10 Thousands/µL   Comprehensive metabolic panel    Collection Time: 03/22/21  7:19 PM   Result Value Ref Range    Sodium 140 136 - 145 mmol/L    Potassium 4 7 3 5 - 5 3 mmol/L    Chloride 100 100 - 108 mmol/L    CO2 28 21 - 32 mmol/L    ANION GAP 12 4 - 13 mmol/L    BUN 14 5 - 25 mg/dL    Creatinine 0 97 0 60 - 1 30 mg/dL    Glucose 124 65 - 140 mg/dL    Calcium 8 9 8 3 - 10 1 mg/dL     (H) 5 - 45 U/L    ALT 48 12 - 78 U/L    Alkaline Phosphatase 63 46 - 116 U/L    Total Protein 8 0 6 4 - 8 2 g/dL    Albumin 3 8 3 5 - 5 0 g/dL    Total Bilirubin 0 40 0 20 - 1 00 mg/dL    eGFR 58 ml/min/1 73sq m   Troponin I    Collection Time: 03/22/21  7:19 PM   Result Value Ref Range    Troponin I >40 00 (H) <=0 04 ng/mL   APTT six (6) hours after Heparin bolus/drip initiation or dosing change    Collection Time: 03/22/21  7:19 PM   Result Value Ref Range    PTT 28 23 - 37 seconds   Protime-INR    Collection Time: 03/22/21  7:19 PM   Result Value Ref Range    Protime 12 8 11 6 - 14 5 seconds    INR 0 95 0 84 - 1 19   Troponin I    Collection Time: 03/22/21 10:43 PM   Result Value Ref Range    Troponin I >40 00 (H) <=0 04 ng/mL   Comprehensive metabolic panel    Collection Time: 03/22/21 10:43 PM   Result Value Ref Range    Sodium 138 136 - 145 mmol/L    Potassium 4 3 3 5 - 5 3 mmol/L    Chloride 104 100 - 108 mmol/L    CO2 24 21 - 32 mmol/L    ANION GAP 10 4 - 13 mmol/L    BUN 13 5 - 25 mg/dL    Creatinine 0 92 0 60 - 1 30 mg/dL    Glucose 127 65 - 140 mg/dL    Calcium 8 8 8 3 - 10 1 mg/dL     (H) 5 - 45 U/L    ALT 48 12 - 78 U/L    Alkaline Phosphatase 55 46 - 116 U/L    Total Protein 7 2 6 4 - 8 2 g/dL    Albumin 3 5 3 5 - 5 0 g/dL    Total Bilirubin 0 41 0 20 - 1 00 mg/dL    eGFR 62 ml/min/1 73sq m   Magnesium    Collection Time: 03/23/21 12:52 AM   Result Value Ref Range    Magnesium 0 9 (LL) 1 6 - 2 6 mg/dL   NT-BNP PRO    Collection Time: 03/23/21 12:52 AM   Result Value Ref Range    NT-proBNP 5,102 (H) <125 pg/mL   Phosphorus    Collection Time: 03/23/21 12:52 AM   Result Value Ref Range    Phosphorus 4 1 2 3 - 4 1 mg/dL   Troponin I    Collection Time: 03/23/21 12:55 AM   Result Value Ref Range    Troponin I >40 00 (H) <=0 04 ng/mL   APTT    Collection Time: 03/23/21  2:21 AM   Result Value Ref Range    PTT 74 (H) 23 - 37 seconds   Fingerstick Glucose (POCT)    Collection Time: 03/23/21  2:35 AM   Result Value Ref Range    POC Glucose 150 (H) 65 - 140 mg/dl   Troponin I    Collection Time: 03/23/21  4:04 AM   Result Value Ref Range    Troponin I >40 00 (H) <=0 04 ng/mL   Magnesium    Collection Time: 03/23/21  4:06 AM   Result Value Ref Range    Magnesium 1 6 1 6 - 2 6 mg/dL   Lipid panel    Collection Time: 03/23/21  4:06 AM   Result Value Ref Range    Cholesterol 195 50 - 200 mg/dL    Triglycerides 169 (H) <=150 mg/dL    HDL, Direct 51 >=40 mg/dL    LDL Calculated 110 (H) 0 - 100 mg/dL    Non-HDL-Chol (CHOL-HDL) 144 mg/dl   Comprehensive metabolic panel    Collection Time: 03/23/21  4:06 AM   Result Value Ref Range    Sodium 138 136 - 145 mmol/L    Potassium 4 0 3 5 - 5 3 mmol/L    Chloride 100 100 - 108 mmol/L    CO2 25 21 - 32 mmol/L    ANION GAP 13 4 - 13 mmol/L    BUN 12 5 - 25 mg/dL    Creatinine 0 89 0 60 - 1 30 mg/dL    Glucose 139 65 - 140 mg/dL    Calcium 8 2 (L) 8 3 - 10 1 mg/dL    Corrected Calcium 8 7 8 3 - 10 1 mg/dL     (H) 5 - 45 U/L    ALT 60 12 - 78 U/L    Alkaline Phosphatase 55 46 - 116 U/L    Total Protein 7 1 6 4 - 8 2 g/dL    Albumin 3 4 (L) 3 5 - 5 0 g/dL    Total Bilirubin 0 54 0 20 - 1 00 mg/dL    eGFR 65 ml/min/1 73sq m   CBC (With Platelets)    Collection Time: 03/23/21  4:06 AM   Result Value Ref Range    WBC 10 94 (H) 4 31 - 10 16 Thousand/uL    RBC 4 22 3 81 - 5 12 Million/uL    Hemoglobin 12 3 11 5 - 15 4 g/dL    Hematocrit 37 5 34 8 - 46 1 %    MCV 89 82 - 98 fL    MCH 29 1 26 8 - 34 3 pg    MCHC 32 8 31 4 - 37 4 g/dL    RDW 15 1 11 6 - 15 1 %    Platelets 893 940 - 346 Thousands/uL    MPV 10 6 8 9 - 12 7 fL   Fingerstick Glucose (POCT)    Collection Time: 03/23/21  6:26 AM   Result Value Ref Range    POC Glucose 142 (H) 65 - 140 mg/dl     Imaging: I have personally reviewed pertinent reports  and I have personally reviewed pertinent films in PACS  Code Status: LVL 1 Full Code  Epic/ Allscripts/Care Everywhere records reviewed: Yes    * Please Note: Fluency DirectDictation voice to text software may have been used in the creation of this document   **

## 2021-03-23 NOTE — ASSESSMENT & PLAN NOTE
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

## 2021-03-23 NOTE — H&P
[facility]  H&P- Tg Bairdill 1945, 70 y o  female MRN: [id number]  Unit/Bed#: ED 18 Encounter: [id number]  Primary Care Provider: Teresa Paul DO   Date and time admitted to hospital: 3/22/2021  7:13 PM    * NSTEMI (non-ST elevated myocardial infarction) [facility])  Assessment & Plan  · Patient presented after having had an episode of chest pain early this morning with SOB, diaphoresis, lightheadedness and nausea/ vomiting  Currently denies chest pain but admits to aching pain in her right arm and "reflux "  · Suspected NSTEMI type 1   · Per ED records, her initial EKG obtained by EMS showed some elevation in lead 3 and AVF with possible reciprocal changes in aVL  · Repeat EKG on arrival NSR with frequent PVCs and ST depression in aVL  · Troponin > 40  0   · CXR with no acute findings noted  · Received loading dose of Brilinta in the emergency department  · Continue Brilinta 90 mg q 12 hours  · Start aspirin 81 mg   · Continue IV heparin  · Case discussed with cardiology, recommending keeping patient NPO for cardiac in AM unless develops any changes/ chest pain overnight  · Notify of any chest pain overnight and obtain repeat EKG  · Apply 2L NC oxygen and nitro paste  · PRN IV morphine  · Continue atenolol 50 mg BID and atorvastatin at home dose of 80 mg daily  · Obtain echo and lipid panel  Elevated AST (SGOT)  Assessment & Plan  · AST elevated to 289 on admission  · ALT, alk phos and T bili within normal limits  · On review of prior lab work from [facility], AST was up to 46 in 2018 but otherwise no prior elevation in LFTs noted  · Obtain repeat CMP to verify  · Continue statin for now unless remains elevated  Hypertension  Assessment & Plan  · BP acceptable on presentation but most recent BP elevated to 240/110  · On atenolol 50 mg BID and amlodipine 5 mg daily as an outpatient  · Give 5 mg of IV metoprolol now and PRN for SBP > 170   Also apply nitro paste   · Continue to monitor BP closely  GERD (gastroesophageal reflux disease)  Assessment & Plan  · Continue PPI  Diabetes mellitus ([facility] [address] )  Assessment & Plan  Lab Results   Component Value Date    HGBA1C 6 8 (H) 04/25/2019       No results for input(s): POCGLU in the last 72 hours  Blood Sugar Average: Last 72 hrs:     · On metformin as an outpatient, hold while inpatient  · Monitor Accu-Cheks q 6 hours while NPO  · SSI coverage  · Hypoglycemia protocol  VTE Prophylaxis: Heparin Drip  / sequential compression device   Code Status: level 1- full code  POLST: POLST form is not discussed and not completed at this time  Discussion with family: patient's daughter at bedside    Anticipated Length of Stay:  Patient will be admitted on an Inpatient basis with an anticipated length of stay of  greater than 2 midnights  Justification for Hospital Stay: NSTEMI, need for cardiology eval and IV heparin  Total Time for Visit, including Counseling / Coordination of Care: 1 hour  Greater than 50% of this total time spent on direct patient counseling and coordination of care  Chief Complaint:   Chest pain    History of Present Illness:    Dante Freeman is a 70 y o  female with a history of HTN, type 2 DM and GERD who presents with chest pain  Patient reports that when she awoke this morning around 730 she went to the bathroom and upon returning back to bed from the bathroom she began feeling lightheaded and then started having severe chest pain which she describes as a squeezing sensation in the center of her chest with radiation to her right arm  She reports at that time she became diaphoretic, short of breath as well as nauseous and vomited multiple times  She states that the episode lasted several minutes prior to her pain improving but notes that throughout the day she has had discomfort in her right arm which she describes as an ache and also notes intermittent headache during the day    Her daughter at bedside reports that she was monitoring the patient's blood pressure throughout the day and contacted her PCP earlier this evening  Patient reportedly did not want to come to the hospital initially but later agreed  Per ED records when EMS arrived her initial EKG showed some elevation in lead III and AVF with possible reciprocal changes in aVL  Patient currently denies chest pain but continues to note aching pain in her right arm and reports feeling diaphoretic at this time  She also reports having a headache and "reflux" which she attributes to not taking her omeprazole today  Patient reports that she has otherwise been in her usual state of health; denies fevers/ chills, cough, abdominal pain, back pain  Per patient's daughter at bedside, patient had COVID-19 last month but reportedly had only mild symptoms  Review of Systems:    Review of Systems   Constitutional: Positive for diaphoresis  Negative for chills and fever  Respiratory: Positive for shortness of breath  Negative for cough  Cardiovascular: Positive for chest pain  Gastrointestinal: Positive for nausea and vomiting  Neurological: Positive for light-headedness and headaches  All other systems reviewed and are negative  Past Medical and Surgical History:     Past Medical History:   Diagnosis Date    Diabetes mellitus ([facility] [address] [facility]     Hypertension        History reviewed  No pertinent surgical history  Meds/Allergies:    Prior to Admission medications    Not on File     I have reviewed home medications with patient family member      Allergies: No Known Allergies    Social History:     Marital Status: /Civil Union   Patient Pre-hospital Living Situation:  Resides at home with her   Patient Pre-hospital Level of Mobility:  Independent  Patient Pre-hospital Diet Restrictions:  Low carb  Substance Use History:   Social History     Substance and Sexual Activity   Alcohol Use Not Currently     Social History Tobacco Use   Smoking Status Never Smoker     Social History     Substance and Sexual Activity   Drug Use Not Currently       Family History:    Family History   Problem Relation Age of Onset    Heart attack Sister     Breast cancer Sister     Heart attack Brother        Physical Exam:     Vitals:   Blood Pressure: (!) 240/110 (03/22/21 2152)  Pulse: 64 (03/22/21 2045)  Respirations: 18 (03/22/21 2045)  Weight - Scale: 97 4 kg (214 lb 11 7 oz) (03/22/21 1915)  SpO2: 97 % (03/22/21 2152)    Physical Exam  Vitals signs and nursing note reviewed  Constitutional:       General: She is in acute distress (appears mildly uncomfortable)  Appearance: She is obese  She is not diaphoretic  HENT:      Head: Normocephalic and atraumatic  Eyes:      Conjunctiva/sclera: Conjunctivae normal    Cardiovascular:      Rate and Rhythm: Normal rate and regular rhythm  Pulmonary:      Effort: Pulmonary effort is normal  No respiratory distress  Breath sounds: Normal breath sounds  Abdominal:      General: Bowel sounds are normal       Palpations: Abdomen is soft  Tenderness: There is no abdominal tenderness  Musculoskeletal:      Right lower leg: No edema  Left lower leg: No edema  Skin:     General: Skin is warm and dry  Coloration: Skin is pale  Neurological:      Mental Status: She is alert and oriented to person, place, and time  Psychiatric:         Mood and Affect: Mood normal          Behavior: Behavior normal          Additional Data:     Lab Results: I have personally reviewed pertinent reports        Results from last 7 days   Lab Units 03/22/21 1919   WBC Thousand/uL 9 91   HEMOGLOBIN g/dL 13 4   HEMATOCRIT % 42 2   PLATELETS Thousands/uL 216   NEUTROS PCT % 74   LYMPHS PCT % 20   MONOS PCT % 5   EOS PCT % 0     Results from last 7 days   Lab Units 03/22/21 1919   SODIUM mmol/L 140   POTASSIUM mmol/L 4 7   CHLORIDE mmol/L 100   CO2 mmol/L 28   BUN mg/dL 14   CREATININE mg/dL 0  97   ANION GAP mmol/L 12   CALCIUM mg/dL 8 9   ALBUMIN g/dL 3 8   TOTAL BILIRUBIN mg/dL 0 40   ALK PHOS U/L 63   ALT U/L 48   AST U/L 289*   GLUCOSE RANDOM mg/dL 124     Results from last 7 days   Lab Units 03/22/21  1919   INR  0 95                   Imaging: I have personally reviewed pertinent films in PACS    XR chest 1 view portable   ED Interpretation by Shaheen Barahona MD (03/22 2015)   This film was interpreted independently by me  No infiltrate, cardiac silhouette normal, no pleural effusions or pulmonary edema  Left arm surgical hardware noted  EKG, Pathology, and Other Studies Reviewed on Admission:   · EKG: sinus rhythm with occasional PVCs, STs depression in aVL    Allscripts / Epic Records Reviewed: Yes     ** Please Note: This note has been constructed using a voice recognition system   **

## 2021-03-23 NOTE — ASSESSMENT & PLAN NOTE
· Per patient has hx SVT on atenolol  · On tele, she has had 2 episodes of SVT with rate 150 that have resolved without intervention   · Continue scheduled metoprolol, hold home atenolol

## 2021-03-23 NOTE — ASSESSMENT & PLAN NOTE
· Patient presented after having had an episode of chest pain early this morning with SOB, diaphoresis, lightheadedness and nausea/ vomiting  Currently denies chest pain but admits to aching pain in her right arm and "reflux "  · Suspected NSTEMI type 1   · Per ED records, her initial EKG obtained by EMS showed some elevation in lead 3 and AVF with possible reciprocal changes in aVL  · Repeat EKG on arrival NSR with frequent PVCs and ST depression in aVL  · Troponin > 40  0   · CXR with no acute findings noted  · Received loading dose of Brilinta in the emergency department  · Continue Brilinta 90 mg q 12 hours  · Start aspirin 81 mg   · Continue IV heparin  · Case discussed with cardiology, recommending keeping patient NPO for cardiac in AM unless develops any changes/ chest pain overnight  · Notify of any chest pain overnight and obtain repeat EKG  · Apply 2L NC oxygen and nitro paste  · PRN IV morphine  · Continue atenolol 50 mg BID and atorvastatin at home dose of 80 mg daily  · Obtain echo and lipid panel

## 2021-03-23 NOTE — ASSESSMENT & PLAN NOTE
· On Norvasc and Atenolol as outpatient  · Continue home norvasc, will hold home atenolol and start metoprolol 25mg Q6H per cardiology   · Will also start Nitro gtt, goal SBP<180

## 2021-03-23 NOTE — QUICK NOTE
Notified by nursing that patient had a brief episode of tachycardia with HR in the 140s followed by HR returning to the 60s  Repeat EKG obtained with prolonged QTc noted  Patient without complaints of chest pain and but continues to note "burning" in her chest per nursing  In regards to the patient's BP, most recent BP has improved to 180/118 after 5 mg of IV metoprolol and nitro paste  Updated on-call cardiologist with the above who recommended discontinuing atenolol and changing patient to metoprolol 25 mg q8h starting now and also recommended starting patient on a nitro drip  Discussed with critical care; transfer patient level 1 stepdown for nitro drip

## 2021-03-23 NOTE — ASSESSMENT & PLAN NOTE
· Per patient has hx SVT on atenolol  · On tele, she has had 2 episodes of SVT with rate 150 that have resolved without intervention   · Continue scheduled metoprolol

## 2021-03-23 NOTE — ASSESSMENT & PLAN NOTE
Lab Results   Component Value Date    HGBA1C 6 8 (H) 04/25/2019       No results for input(s): POCGLU in the last 72 hours      Blood Sugar Average: Last 72 hrs:  · Continue Q6H glucose checks and SSI for goal glucose 140-180

## 2021-03-24 ENCOUNTER — APPOINTMENT (INPATIENT)
Dept: RADIOLOGY | Facility: HOSPITAL | Age: 76
DRG: 236 | End: 2021-03-24
Payer: COMMERCIAL

## 2021-03-24 ENCOUNTER — APPOINTMENT (INPATIENT)
Dept: NON INVASIVE DIAGNOSTICS | Facility: HOSPITAL | Age: 76
DRG: 236 | End: 2021-03-24
Payer: COMMERCIAL

## 2021-03-24 PROBLEM — I25.119 CORONARY ARTERY DISEASE INVOLVING NATIVE CORONARY ARTERY OF NATIVE HEART WITH ANGINA PECTORIS (HCC): Status: ACTIVE | Noted: 2021-03-24

## 2021-03-24 LAB
ANION GAP SERPL CALCULATED.3IONS-SCNC: 6 MMOL/L (ref 4–13)
APTT PPP: 73 SECONDS (ref 23–37)
ATRIAL RATE: 63 BPM
ATRIAL RATE: 85 BPM
BUN SERPL-MCNC: 17 MG/DL (ref 5–25)
CALCIUM SERPL-MCNC: 8 MG/DL (ref 8.3–10.1)
CHLORIDE SERPL-SCNC: 107 MMOL/L (ref 100–108)
CO2 SERPL-SCNC: 25 MMOL/L (ref 21–32)
CREAT SERPL-MCNC: 0.88 MG/DL (ref 0.6–1.3)
ERYTHROCYTE [DISTWIDTH] IN BLOOD BY AUTOMATED COUNT: 15.8 % (ref 11.6–15.1)
GFR SERPL CREATININE-BSD FRML MDRD: 65 ML/MIN/1.73SQ M
GLUCOSE SERPL-MCNC: 117 MG/DL (ref 65–140)
GLUCOSE SERPL-MCNC: 119 MG/DL (ref 65–140)
GLUCOSE SERPL-MCNC: 120 MG/DL (ref 65–140)
GLUCOSE SERPL-MCNC: 161 MG/DL (ref 65–140)
GLUCOSE SERPL-MCNC: 192 MG/DL (ref 65–140)
HCT VFR BLD AUTO: 33.1 % (ref 34.8–46.1)
HGB BLD-MCNC: 10.5 G/DL (ref 11.5–15.4)
MAGNESIUM SERPL-MCNC: 1.9 MG/DL (ref 1.6–2.6)
MCH RBC QN AUTO: 29 PG (ref 26.8–34.3)
MCHC RBC AUTO-ENTMCNC: 31.7 G/DL (ref 31.4–37.4)
MCV RBC AUTO: 91 FL (ref 82–98)
P AXIS: 62 DEGREES
PLATELET # BLD AUTO: 166 THOUSANDS/UL (ref 149–390)
PMV BLD AUTO: 10.8 FL (ref 8.9–12.7)
POTASSIUM SERPL-SCNC: 3.9 MMOL/L (ref 3.5–5.3)
PR INTERVAL: 88 MS
QRS AXIS: -24 DEGREES
QRS AXIS: -27 DEGREES
QRSD INTERVAL: 88 MS
QRSD INTERVAL: 88 MS
QT INTERVAL: 316 MS
QT INTERVAL: 366 MS
QTC INTERVAL: 435 MS
QTC INTERVAL: 478 MS
RBC # BLD AUTO: 3.62 MILLION/UL (ref 3.81–5.12)
SODIUM SERPL-SCNC: 138 MMOL/L (ref 136–145)
T WAVE AXIS: 105 DEGREES
T WAVE AXIS: 240 DEGREES
VENTRICULAR RATE: 138 BPM
VENTRICULAR RATE: 85 BPM
WBC # BLD AUTO: 7.82 THOUSAND/UL (ref 4.31–10.16)

## 2021-03-24 PROCEDURE — 93880 EXTRACRANIAL BILAT STUDY: CPT

## 2021-03-24 PROCEDURE — 80048 BASIC METABOLIC PNL TOTAL CA: CPT | Performed by: INTERNAL MEDICINE

## 2021-03-24 PROCEDURE — 93971 EXTREMITY STUDY: CPT

## 2021-03-24 PROCEDURE — 82948 REAGENT STRIP/BLOOD GLUCOSE: CPT

## 2021-03-24 PROCEDURE — 93971 EXTREMITY STUDY: CPT | Performed by: SURGERY

## 2021-03-24 PROCEDURE — 85730 THROMBOPLASTIN TIME PARTIAL: CPT | Performed by: INTERNAL MEDICINE

## 2021-03-24 PROCEDURE — 93010 ELECTROCARDIOGRAM REPORT: CPT | Performed by: INTERNAL MEDICINE

## 2021-03-24 PROCEDURE — 99233 SBSQ HOSP IP/OBS HIGH 50: CPT | Performed by: GENERAL PRACTICE

## 2021-03-24 PROCEDURE — G1004 CDSM NDSC: HCPCS

## 2021-03-24 PROCEDURE — 71250 CT THORAX DX C-: CPT

## 2021-03-24 PROCEDURE — 85027 COMPLETE CBC AUTOMATED: CPT | Performed by: INTERNAL MEDICINE

## 2021-03-24 PROCEDURE — 83735 ASSAY OF MAGNESIUM: CPT | Performed by: INTERNAL MEDICINE

## 2021-03-24 PROCEDURE — 99223 1ST HOSP IP/OBS HIGH 75: CPT | Performed by: THORACIC SURGERY (CARDIOTHORACIC VASCULAR SURGERY)

## 2021-03-24 PROCEDURE — 99232 SBSQ HOSP IP/OBS MODERATE 35: CPT | Performed by: INTERNAL MEDICINE

## 2021-03-24 RX ORDER — ACETAMINOPHEN 325 MG/1
650 TABLET ORAL EVERY 8 HOURS PRN
Status: DISCONTINUED | OUTPATIENT
Start: 2021-03-24 | End: 2021-03-31 | Stop reason: HOSPADM

## 2021-03-24 RX ORDER — MAGNESIUM SULFATE HEPTAHYDRATE 40 MG/ML
2 INJECTION, SOLUTION INTRAVENOUS ONCE
Status: COMPLETED | OUTPATIENT
Start: 2021-03-24 | End: 2021-03-24

## 2021-03-24 RX ORDER — POTASSIUM CHLORIDE 750 MG/1
10 TABLET, EXTENDED RELEASE ORAL ONCE
Status: COMPLETED | OUTPATIENT
Start: 2021-03-24 | End: 2021-03-24

## 2021-03-24 RX ADMIN — AMLODIPINE BESYLATE 5 MG: 5 TABLET ORAL at 08:11

## 2021-03-24 RX ADMIN — INSULIN LISPRO 2 UNITS: 100 INJECTION, SOLUTION INTRAVENOUS; SUBCUTANEOUS at 22:03

## 2021-03-24 RX ADMIN — POTASSIUM CHLORIDE 10 MEQ: 750 TABLET, EXTENDED RELEASE ORAL at 11:33

## 2021-03-24 RX ADMIN — CARVEDILOL 6.25 MG: 6.25 TABLET, FILM COATED ORAL at 08:10

## 2021-03-24 RX ADMIN — ASPIRIN 81 MG: 81 TABLET, CHEWABLE ORAL at 08:11

## 2021-03-24 RX ADMIN — ATORVASTATIN CALCIUM 80 MG: 80 TABLET, FILM COATED ORAL at 17:25

## 2021-03-24 RX ADMIN — MAGNESIUM SULFATE HEPTAHYDRATE 2 G: 40 INJECTION, SOLUTION INTRAVENOUS at 11:32

## 2021-03-24 RX ADMIN — CARVEDILOL 6.25 MG: 6.25 TABLET, FILM COATED ORAL at 17:25

## 2021-03-24 RX ADMIN — PANTOPRAZOLE SODIUM 40 MG: 40 TABLET, DELAYED RELEASE ORAL at 06:27

## 2021-03-24 RX ADMIN — HEPARIN SODIUM 11.1 UNITS/KG/HR: 10000 INJECTION, SOLUTION INTRAVENOUS at 22:04

## 2021-03-24 NOTE — NURSING NOTE
Report called to P4 nurse  Family updated that patient will be picked up at 2045 and transferred to Adán Vivar RN

## 2021-03-24 NOTE — PLAN OF CARE
Problem: Potential for Falls  Goal: Patient will remain free of falls  Description: INTERVENTIONS:  - Assess patient frequently for physical needs  -  Identify cognitive and physical deficits and behaviors that affect risk of falls    -  [place] fall precautions as indicated by assessment   - Educate patient/family on patient safety including physical limitations  - Instruct patient to call for assistance with activity based on assessment  - Modify environment to reduce risk of injury  - Consider OT/PT consult to assist with strengthening/mobility  Outcome: Progressing     Problem: Prexisting or High Potential for Compromised Skin Integrity  Goal: Skin integrity is maintained or improved  Description: INTERVENTIONS:  - Identify patients at risk for skin breakdown  - Assess and monitor skin integrity  - Assess and monitor nutrition and hydration status  - Monitor labs   - Assess for incontinence   - Turn and reposition patient  - Assist with mobility/ambulation  - Relieve pressure over bony prominences  - Avoid friction and shearing  - Provide appropriate hygiene as needed including keeping skin clean and dry  - Evaluate need for skin moisturizer/barrier cream  - Collaborate with interdisciplinary team   - Patient/family teaching  - Consider wound care consult   Outcome: Progressing

## 2021-03-25 PROBLEM — I65.23 BILATERAL CAROTID ARTERY STENOSIS: Status: ACTIVE | Noted: 2021-03-25

## 2021-03-25 LAB
ALBUMIN SERPL BCP-MCNC: 3.2 G/DL (ref 3.5–5)
ALP SERPL-CCNC: 52 U/L (ref 46–116)
ALT SERPL W P-5'-P-CCNC: 39 U/L (ref 12–78)
ANION GAP SERPL CALCULATED.3IONS-SCNC: 5 MMOL/L (ref 4–13)
APTT PPP: 66 SECONDS (ref 23–37)
AST SERPL W P-5'-P-CCNC: 164 U/L (ref 5–45)
BACTERIA UR QL AUTO: ABNORMAL /HPF
BILIRUB SERPL-MCNC: 0.65 MG/DL (ref 0.2–1)
BILIRUB UR QL STRIP: NEGATIVE
BUN SERPL-MCNC: 17 MG/DL (ref 5–25)
CALCIUM ALBUM COR SERPL-MCNC: 9.3 MG/DL (ref 8.3–10.1)
CALCIUM SERPL-MCNC: 8.7 MG/DL (ref 8.3–10.1)
CHLORIDE SERPL-SCNC: 109 MMOL/L (ref 100–108)
CLARITY UR: CLEAR
CO2 SERPL-SCNC: 26 MMOL/L (ref 21–32)
COLOR UR: YELLOW
CREAT SERPL-MCNC: 0.83 MG/DL (ref 0.6–1.3)
ERYTHROCYTE [DISTWIDTH] IN BLOOD BY AUTOMATED COUNT: 15.5 % (ref 11.6–15.1)
GFR SERPL CREATININE-BSD FRML MDRD: 70 ML/MIN/1.73SQ M
GLUCOSE SERPL-MCNC: 120 MG/DL (ref 65–140)
GLUCOSE SERPL-MCNC: 122 MG/DL (ref 65–140)
GLUCOSE SERPL-MCNC: 127 MG/DL (ref 65–140)
GLUCOSE SERPL-MCNC: 144 MG/DL (ref 65–140)
GLUCOSE SERPL-MCNC: 151 MG/DL (ref 65–140)
GLUCOSE UR STRIP-MCNC: NEGATIVE MG/DL
HCT VFR BLD AUTO: 34.5 % (ref 34.8–46.1)
HGB BLD-MCNC: 10.9 G/DL (ref 11.5–15.4)
HGB UR QL STRIP.AUTO: NEGATIVE
HYALINE CASTS #/AREA URNS LPF: ABNORMAL /LPF
KETONES UR STRIP-MCNC: NEGATIVE MG/DL
LEUKOCYTE ESTERASE UR QL STRIP: ABNORMAL
MAGNESIUM SERPL-MCNC: 2.5 MG/DL (ref 1.6–2.6)
MCH RBC QN AUTO: 29.2 PG (ref 26.8–34.3)
MCHC RBC AUTO-ENTMCNC: 31.6 G/DL (ref 31.4–37.4)
MCV RBC AUTO: 93 FL (ref 82–98)
NITRITE UR QL STRIP: NEGATIVE
NON-SQ EPI CELLS URNS QL MICRO: ABNORMAL /HPF
PH UR STRIP.AUTO: 6.5 [PH]
PHOSPHATE SERPL-MCNC: 3.8 MG/DL (ref 2.3–4.1)
PLATELET # BLD AUTO: 175 THOUSANDS/UL (ref 149–390)
PMV BLD AUTO: 10.6 FL (ref 8.9–12.7)
POTASSIUM SERPL-SCNC: 4 MMOL/L (ref 3.5–5.3)
PROT SERPL-MCNC: 7.2 G/DL (ref 6.4–8.2)
PROT UR STRIP-MCNC: NEGATIVE MG/DL
RBC # BLD AUTO: 3.73 MILLION/UL (ref 3.81–5.12)
RBC #/AREA URNS AUTO: ABNORMAL /HPF
SODIUM SERPL-SCNC: 140 MMOL/L (ref 136–145)
SP GR UR STRIP.AUTO: 1.01 (ref 1–1.03)
UROBILINOGEN UR QL STRIP.AUTO: 1 E.U./DL
WBC # BLD AUTO: 7.13 THOUSAND/UL (ref 4.31–10.16)
WBC #/AREA URNS AUTO: ABNORMAL /HPF

## 2021-03-25 PROCEDURE — 85027 COMPLETE CBC AUTOMATED: CPT | Performed by: GENERAL PRACTICE

## 2021-03-25 PROCEDURE — 99222 1ST HOSP IP/OBS MODERATE 55: CPT | Performed by: SURGERY

## 2021-03-25 PROCEDURE — 81001 URINALYSIS AUTO W/SCOPE: CPT | Performed by: PHYSICIAN ASSISTANT

## 2021-03-25 PROCEDURE — 80053 COMPREHEN METABOLIC PANEL: CPT | Performed by: GENERAL PRACTICE

## 2021-03-25 PROCEDURE — 83735 ASSAY OF MAGNESIUM: CPT | Performed by: GENERAL PRACTICE

## 2021-03-25 PROCEDURE — 99232 SBSQ HOSP IP/OBS MODERATE 35: CPT | Performed by: INTERNAL MEDICINE

## 2021-03-25 PROCEDURE — 99232 SBSQ HOSP IP/OBS MODERATE 35: CPT | Performed by: GENERAL PRACTICE

## 2021-03-25 PROCEDURE — 87081 CULTURE SCREEN ONLY: CPT | Performed by: PHYSICIAN ASSISTANT

## 2021-03-25 PROCEDURE — 82948 REAGENT STRIP/BLOOD GLUCOSE: CPT

## 2021-03-25 PROCEDURE — 99232 SBSQ HOSP IP/OBS MODERATE 35: CPT | Performed by: PHYSICIAN ASSISTANT

## 2021-03-25 PROCEDURE — 85730 THROMBOPLASTIN TIME PARTIAL: CPT | Performed by: INTERNAL MEDICINE

## 2021-03-25 PROCEDURE — 84100 ASSAY OF PHOSPHORUS: CPT | Performed by: GENERAL PRACTICE

## 2021-03-25 RX ORDER — CHLORHEXIDINE GLUCONATE 0.12 MG/ML
15 RINSE ORAL EVERY 12 HOURS SCHEDULED
Status: DISCONTINUED | OUTPATIENT
Start: 2021-03-25 | End: 2021-03-31 | Stop reason: HOSPADM

## 2021-03-25 RX ADMIN — AMLODIPINE BESYLATE 5 MG: 5 TABLET ORAL at 09:20

## 2021-03-25 RX ADMIN — CARVEDILOL 6.25 MG: 6.25 TABLET, FILM COATED ORAL at 17:01

## 2021-03-25 RX ADMIN — CHLORHEXIDINE GLUCONATE 0.12% ORAL RINSE 15 ML: 1.2 LIQUID ORAL at 21:46

## 2021-03-25 RX ADMIN — ATORVASTATIN CALCIUM 80 MG: 80 TABLET, FILM COATED ORAL at 17:01

## 2021-03-25 RX ADMIN — ASPIRIN 81 MG: 81 TABLET, CHEWABLE ORAL at 09:20

## 2021-03-25 RX ADMIN — PANTOPRAZOLE SODIUM 40 MG: 40 TABLET, DELAYED RELEASE ORAL at 05:35

## 2021-03-25 RX ADMIN — INSULIN LISPRO 1 UNITS: 100 INJECTION, SOLUTION INTRAVENOUS; SUBCUTANEOUS at 06:30

## 2021-03-25 RX ADMIN — CARVEDILOL 6.25 MG: 6.25 TABLET, FILM COATED ORAL at 09:20

## 2021-03-25 RX ADMIN — MUPIROCIN 1 APPLICATION: 20 OINTMENT TOPICAL at 09:20

## 2021-03-25 RX ADMIN — CHLORHEXIDINE GLUCONATE 0.12% ORAL RINSE 15 ML: 1.2 LIQUID ORAL at 09:20

## 2021-03-25 RX ADMIN — MUPIROCIN 1 APPLICATION: 20 OINTMENT TOPICAL at 21:46

## 2021-03-26 LAB
ALBUMIN SERPL BCP-MCNC: 3 G/DL (ref 3.5–5)
ALP SERPL-CCNC: 53 U/L (ref 46–116)
ALT SERPL W P-5'-P-CCNC: 31 U/L (ref 12–78)
ANION GAP SERPL CALCULATED.3IONS-SCNC: 8 MMOL/L (ref 4–13)
APTT PPP: 56 SECONDS (ref 23–37)
APTT PPP: 58 SECONDS (ref 23–37)
APTT PPP: 60 SECONDS (ref 23–37)
AST SERPL W P-5'-P-CCNC: 85 U/L (ref 5–45)
BILIRUB SERPL-MCNC: 0.52 MG/DL (ref 0.2–1)
BUN SERPL-MCNC: 17 MG/DL (ref 5–25)
CALCIUM ALBUM COR SERPL-MCNC: 9.5 MG/DL (ref 8.3–10.1)
CALCIUM SERPL-MCNC: 8.7 MG/DL (ref 8.3–10.1)
CHLORIDE SERPL-SCNC: 106 MMOL/L (ref 100–108)
CO2 SERPL-SCNC: 24 MMOL/L (ref 21–32)
CREAT SERPL-MCNC: 0.78 MG/DL (ref 0.6–1.3)
ERYTHROCYTE [DISTWIDTH] IN BLOOD BY AUTOMATED COUNT: 15.5 % (ref 11.6–15.1)
GFR SERPL CREATININE-BSD FRML MDRD: 76 ML/MIN/1.73SQ M
GLUCOSE SERPL-MCNC: 120 MG/DL (ref 65–140)
GLUCOSE SERPL-MCNC: 121 MG/DL (ref 65–140)
GLUCOSE SERPL-MCNC: 131 MG/DL (ref 65–140)
GLUCOSE SERPL-MCNC: 147 MG/DL (ref 65–140)
GLUCOSE SERPL-MCNC: 94 MG/DL (ref 65–140)
HCT VFR BLD AUTO: 31.7 % (ref 34.8–46.1)
HGB BLD-MCNC: 10 G/DL (ref 11.5–15.4)
MCH RBC QN AUTO: 29.2 PG (ref 26.8–34.3)
MCHC RBC AUTO-ENTMCNC: 31.5 G/DL (ref 31.4–37.4)
MCV RBC AUTO: 92 FL (ref 82–98)
MRSA NOSE QL CULT: NORMAL
PLATELET # BLD AUTO: 159 THOUSANDS/UL (ref 149–390)
PMV BLD AUTO: 10.7 FL (ref 8.9–12.7)
POTASSIUM SERPL-SCNC: 4 MMOL/L (ref 3.5–5.3)
PROT SERPL-MCNC: 7 G/DL (ref 6.4–8.2)
RBC # BLD AUTO: 3.43 MILLION/UL (ref 3.81–5.12)
SODIUM SERPL-SCNC: 138 MMOL/L (ref 136–145)
WBC # BLD AUTO: 6.7 THOUSAND/UL (ref 4.31–10.16)

## 2021-03-26 PROCEDURE — 80053 COMPREHEN METABOLIC PANEL: CPT | Performed by: GENERAL PRACTICE

## 2021-03-26 PROCEDURE — 85730 THROMBOPLASTIN TIME PARTIAL: CPT | Performed by: THORACIC SURGERY (CARDIOTHORACIC VASCULAR SURGERY)

## 2021-03-26 PROCEDURE — 85730 THROMBOPLASTIN TIME PARTIAL: CPT | Performed by: GENERAL PRACTICE

## 2021-03-26 PROCEDURE — 82948 REAGENT STRIP/BLOOD GLUCOSE: CPT

## 2021-03-26 PROCEDURE — 99231 SBSQ HOSP IP/OBS SF/LOW 25: CPT | Performed by: THORACIC SURGERY (CARDIOTHORACIC VASCULAR SURGERY)

## 2021-03-26 PROCEDURE — 99232 SBSQ HOSP IP/OBS MODERATE 35: CPT | Performed by: INTERNAL MEDICINE

## 2021-03-26 PROCEDURE — 93880 EXTRACRANIAL BILAT STUDY: CPT | Performed by: SURGERY

## 2021-03-26 PROCEDURE — 99232 SBSQ HOSP IP/OBS MODERATE 35: CPT | Performed by: GENERAL PRACTICE

## 2021-03-26 PROCEDURE — 85027 COMPLETE CBC AUTOMATED: CPT | Performed by: GENERAL PRACTICE

## 2021-03-26 RX ADMIN — MUPIROCIN 1 APPLICATION: 20 OINTMENT TOPICAL at 08:54

## 2021-03-26 RX ADMIN — ASPIRIN 81 MG: 81 TABLET, CHEWABLE ORAL at 08:54

## 2021-03-26 RX ADMIN — CARVEDILOL 6.25 MG: 6.25 TABLET, FILM COATED ORAL at 08:54

## 2021-03-26 RX ADMIN — CARVEDILOL 6.25 MG: 6.25 TABLET, FILM COATED ORAL at 17:23

## 2021-03-26 RX ADMIN — HEPARIN SODIUM 11.1 UNITS/KG/HR: 10000 INJECTION, SOLUTION INTRAVENOUS at 01:07

## 2021-03-26 RX ADMIN — MUPIROCIN 1 APPLICATION: 20 OINTMENT TOPICAL at 22:07

## 2021-03-26 RX ADMIN — CHLORHEXIDINE GLUCONATE 0.12% ORAL RINSE 15 ML: 1.2 LIQUID ORAL at 08:54

## 2021-03-26 RX ADMIN — AMLODIPINE BESYLATE 5 MG: 5 TABLET ORAL at 08:54

## 2021-03-26 RX ADMIN — ATORVASTATIN CALCIUM 80 MG: 80 TABLET, FILM COATED ORAL at 17:23

## 2021-03-26 RX ADMIN — PANTOPRAZOLE SODIUM 40 MG: 40 TABLET, DELAYED RELEASE ORAL at 05:39

## 2021-03-26 RX ADMIN — CHLORHEXIDINE GLUCONATE 0.12% ORAL RINSE 15 ML: 1.2 LIQUID ORAL at 22:07

## 2021-03-27 ENCOUNTER — APPOINTMENT (INPATIENT)
Dept: PULMONOLOGY | Facility: HOSPITAL | Age: 76
DRG: 236 | End: 2021-03-27
Payer: COMMERCIAL

## 2021-03-27 LAB
ALBUMIN SERPL BCP-MCNC: 3.1 G/DL (ref 3.5–5)
ALP SERPL-CCNC: 57 U/L (ref 46–116)
ALT SERPL W P-5'-P-CCNC: 34 U/L (ref 12–78)
ANION GAP SERPL CALCULATED.3IONS-SCNC: 6 MMOL/L (ref 4–13)
APTT PPP: 76 SECONDS (ref 23–37)
APTT PPP: 93 SECONDS (ref 23–37)
APTT PPP: 96 SECONDS (ref 23–37)
AST SERPL W P-5'-P-CCNC: 58 U/L (ref 5–45)
BILIRUB SERPL-MCNC: 0.52 MG/DL (ref 0.2–1)
BUN SERPL-MCNC: 18 MG/DL (ref 5–25)
CALCIUM ALBUM COR SERPL-MCNC: 9.8 MG/DL (ref 8.3–10.1)
CALCIUM SERPL-MCNC: 9.1 MG/DL (ref 8.3–10.1)
CHLORIDE SERPL-SCNC: 110 MMOL/L (ref 100–108)
CO2 SERPL-SCNC: 25 MMOL/L (ref 21–32)
CREAT SERPL-MCNC: 0.86 MG/DL (ref 0.6–1.3)
ERYTHROCYTE [DISTWIDTH] IN BLOOD BY AUTOMATED COUNT: 15.2 % (ref 11.6–15.1)
GFR SERPL CREATININE-BSD FRML MDRD: 67 ML/MIN/1.73SQ M
GLUCOSE SERPL-MCNC: 105 MG/DL (ref 65–140)
GLUCOSE SERPL-MCNC: 116 MG/DL (ref 65–140)
GLUCOSE SERPL-MCNC: 117 MG/DL (ref 65–140)
GLUCOSE SERPL-MCNC: 122 MG/DL (ref 65–140)
GLUCOSE SERPL-MCNC: 144 MG/DL (ref 65–140)
HCT VFR BLD AUTO: 32.2 % (ref 34.8–46.1)
HGB BLD-MCNC: 10.2 G/DL (ref 11.5–15.4)
MCH RBC QN AUTO: 29.1 PG (ref 26.8–34.3)
MCHC RBC AUTO-ENTMCNC: 31.7 G/DL (ref 31.4–37.4)
MCV RBC AUTO: 92 FL (ref 82–98)
PLATELET # BLD AUTO: 174 THOUSANDS/UL (ref 149–390)
PMV BLD AUTO: 10.6 FL (ref 8.9–12.7)
POTASSIUM SERPL-SCNC: 4.4 MMOL/L (ref 3.5–5.3)
PROT SERPL-MCNC: 7.3 G/DL (ref 6.4–8.2)
RBC # BLD AUTO: 3.5 MILLION/UL (ref 3.81–5.12)
SODIUM SERPL-SCNC: 141 MMOL/L (ref 136–145)
WBC # BLD AUTO: 6.8 THOUSAND/UL (ref 4.31–10.16)

## 2021-03-27 PROCEDURE — 99232 SBSQ HOSP IP/OBS MODERATE 35: CPT | Performed by: GENERAL PRACTICE

## 2021-03-27 PROCEDURE — 85730 THROMBOPLASTIN TIME PARTIAL: CPT | Performed by: GENERAL PRACTICE

## 2021-03-27 PROCEDURE — 94010 BREATHING CAPACITY TEST: CPT

## 2021-03-27 PROCEDURE — 94010 BREATHING CAPACITY TEST: CPT | Performed by: INTERNAL MEDICINE

## 2021-03-27 PROCEDURE — 85027 COMPLETE CBC AUTOMATED: CPT | Performed by: GENERAL PRACTICE

## 2021-03-27 PROCEDURE — 82948 REAGENT STRIP/BLOOD GLUCOSE: CPT

## 2021-03-27 PROCEDURE — 80053 COMPREHEN METABOLIC PANEL: CPT | Performed by: GENERAL PRACTICE

## 2021-03-27 PROCEDURE — 99232 SBSQ HOSP IP/OBS MODERATE 35: CPT | Performed by: INTERNAL MEDICINE

## 2021-03-27 RX ORDER — SENNOSIDES 8.6 MG
1 TABLET ORAL
Status: DISCONTINUED | OUTPATIENT
Start: 2021-03-27 | End: 2021-03-31 | Stop reason: HOSPADM

## 2021-03-27 RX ORDER — DOCUSATE SODIUM 100 MG/1
100 CAPSULE, LIQUID FILLED ORAL 2 TIMES DAILY
Status: DISCONTINUED | OUTPATIENT
Start: 2021-03-27 | End: 2021-03-31 | Stop reason: HOSPADM

## 2021-03-27 RX ADMIN — ATORVASTATIN CALCIUM 80 MG: 80 TABLET, FILM COATED ORAL at 17:01

## 2021-03-27 RX ADMIN — CHLORHEXIDINE GLUCONATE 0.12% ORAL RINSE 15 ML: 1.2 LIQUID ORAL at 08:35

## 2021-03-27 RX ADMIN — MUPIROCIN 1 APPLICATION: 20 OINTMENT TOPICAL at 21:02

## 2021-03-27 RX ADMIN — DOCUSATE SODIUM 100 MG: 100 CAPSULE, LIQUID FILLED ORAL at 17:01

## 2021-03-27 RX ADMIN — HEPARIN SODIUM 15.1 UNITS/KG/HR: 10000 INJECTION, SOLUTION INTRAVENOUS at 00:43

## 2021-03-27 RX ADMIN — CARVEDILOL 6.25 MG: 6.25 TABLET, FILM COATED ORAL at 08:35

## 2021-03-27 RX ADMIN — CARVEDILOL 6.25 MG: 6.25 TABLET, FILM COATED ORAL at 17:01

## 2021-03-27 RX ADMIN — CHLORHEXIDINE GLUCONATE 0.12% ORAL RINSE 15 ML: 1.2 LIQUID ORAL at 21:02

## 2021-03-27 RX ADMIN — MUPIROCIN 1 APPLICATION: 20 OINTMENT TOPICAL at 08:35

## 2021-03-27 RX ADMIN — ASPIRIN 81 MG: 81 TABLET, CHEWABLE ORAL at 08:35

## 2021-03-27 RX ADMIN — AMLODIPINE BESYLATE 5 MG: 5 TABLET ORAL at 08:35

## 2021-03-27 RX ADMIN — DOCUSATE SODIUM 100 MG: 100 CAPSULE, LIQUID FILLED ORAL at 10:05

## 2021-03-27 RX ADMIN — PANTOPRAZOLE SODIUM 40 MG: 40 TABLET, DELAYED RELEASE ORAL at 05:55

## 2021-03-27 RX ADMIN — HEPARIN SODIUM 11.1 UNITS/KG/HR: 10000 INJECTION, SOLUTION INTRAVENOUS at 22:56

## 2021-03-28 PROBLEM — R74.01 ELEVATED AST (SGOT): Status: RESOLVED | Noted: 2021-03-22 | Resolved: 2021-03-28

## 2021-03-28 LAB
ABO GROUP BLD: NORMAL
ABO GROUP BLD: NORMAL
ALBUMIN SERPL BCP-MCNC: 2.9 G/DL (ref 3.5–5)
ALP SERPL-CCNC: 57 U/L (ref 46–116)
ALT SERPL W P-5'-P-CCNC: 30 U/L (ref 12–78)
ANION GAP SERPL CALCULATED.3IONS-SCNC: 6 MMOL/L (ref 4–13)
APTT PPP: 49 SECONDS (ref 23–37)
APTT PPP: 66 SECONDS (ref 23–37)
APTT PPP: 69 SECONDS (ref 23–37)
AST SERPL W P-5'-P-CCNC: 38 U/L (ref 5–45)
BILIRUB SERPL-MCNC: 0.4 MG/DL (ref 0.2–1)
BLD GP AB SCN SERPL QL: NEGATIVE
BUN SERPL-MCNC: 16 MG/DL (ref 5–25)
CALCIUM ALBUM COR SERPL-MCNC: 9.9 MG/DL (ref 8.3–10.1)
CALCIUM SERPL-MCNC: 9 MG/DL (ref 8.3–10.1)
CHLORIDE SERPL-SCNC: 111 MMOL/L (ref 100–108)
CO2 SERPL-SCNC: 25 MMOL/L (ref 21–32)
CREAT SERPL-MCNC: 0.89 MG/DL (ref 0.6–1.3)
ERYTHROCYTE [DISTWIDTH] IN BLOOD BY AUTOMATED COUNT: 15.2 % (ref 11.6–15.1)
FLUAV RNA RESP QL NAA+PROBE: NEGATIVE
FLUBV RNA RESP QL NAA+PROBE: NEGATIVE
GFR SERPL CREATININE-BSD FRML MDRD: 64 ML/MIN/1.73SQ M
GLUCOSE SERPL-MCNC: 104 MG/DL (ref 65–140)
GLUCOSE SERPL-MCNC: 105 MG/DL (ref 65–140)
GLUCOSE SERPL-MCNC: 115 MG/DL (ref 65–140)
GLUCOSE SERPL-MCNC: 139 MG/DL (ref 65–140)
HCT VFR BLD AUTO: 31.8 % (ref 34.8–46.1)
HGB BLD-MCNC: 9.8 G/DL (ref 11.5–15.4)
MCH RBC QN AUTO: 28.7 PG (ref 26.8–34.3)
MCHC RBC AUTO-ENTMCNC: 30.8 G/DL (ref 31.4–37.4)
MCV RBC AUTO: 93 FL (ref 82–98)
PLATELET # BLD AUTO: 171 THOUSANDS/UL (ref 149–390)
PMV BLD AUTO: 10.6 FL (ref 8.9–12.7)
POTASSIUM SERPL-SCNC: 4.3 MMOL/L (ref 3.5–5.3)
PROT SERPL-MCNC: 6.9 G/DL (ref 6.4–8.2)
RBC # BLD AUTO: 3.42 MILLION/UL (ref 3.81–5.12)
RH BLD: POSITIVE
RH BLD: POSITIVE
RSV RNA RESP QL NAA+PROBE: NEGATIVE
SARS-COV-2 RNA RESP QL NAA+PROBE: POSITIVE
SODIUM SERPL-SCNC: 142 MMOL/L (ref 136–145)
SPECIMEN EXPIRATION DATE: NORMAL
WBC # BLD AUTO: 5.78 THOUSAND/UL (ref 4.31–10.16)

## 2021-03-28 PROCEDURE — 99232 SBSQ HOSP IP/OBS MODERATE 35: CPT | Performed by: INTERNAL MEDICINE

## 2021-03-28 PROCEDURE — 0241U HB NFCT DS VIR RESP RNA 4 TRGT: CPT | Performed by: PHYSICIAN ASSISTANT

## 2021-03-28 PROCEDURE — 85730 THROMBOPLASTIN TIME PARTIAL: CPT | Performed by: GENERAL PRACTICE

## 2021-03-28 PROCEDURE — 80053 COMPREHEN METABOLIC PANEL: CPT | Performed by: GENERAL PRACTICE

## 2021-03-28 PROCEDURE — 99232 SBSQ HOSP IP/OBS MODERATE 35: CPT | Performed by: GENERAL PRACTICE

## 2021-03-28 PROCEDURE — 86901 BLOOD TYPING SEROLOGIC RH(D): CPT | Performed by: PHYSICIAN ASSISTANT

## 2021-03-28 PROCEDURE — 86900 BLOOD TYPING SEROLOGIC ABO: CPT | Performed by: PHYSICIAN ASSISTANT

## 2021-03-28 PROCEDURE — 86850 RBC ANTIBODY SCREEN: CPT | Performed by: PHYSICIAN ASSISTANT

## 2021-03-28 PROCEDURE — 82948 REAGENT STRIP/BLOOD GLUCOSE: CPT

## 2021-03-28 PROCEDURE — 85027 COMPLETE CBC AUTOMATED: CPT | Performed by: GENERAL PRACTICE

## 2021-03-28 PROCEDURE — 99231 SBSQ HOSP IP/OBS SF/LOW 25: CPT | Performed by: THORACIC SURGERY (CARDIOTHORACIC VASCULAR SURGERY)

## 2021-03-28 RX ORDER — CHLORHEXIDINE GLUCONATE 0.12 MG/ML
15 RINSE ORAL ONCE
Status: DISCONTINUED | OUTPATIENT
Start: 2021-03-29 | End: 2021-03-30

## 2021-03-28 RX ORDER — CEFAZOLIN SODIUM 2 G/50ML
2000 SOLUTION INTRAVENOUS ONCE
Status: CANCELLED | OUTPATIENT
Start: 2021-03-28 | End: 2021-03-28

## 2021-03-28 RX ADMIN — PANTOPRAZOLE SODIUM 40 MG: 40 TABLET, DELAYED RELEASE ORAL at 06:03

## 2021-03-28 RX ADMIN — MUPIROCIN 1 APPLICATION: 20 OINTMENT TOPICAL at 09:19

## 2021-03-28 RX ADMIN — DOCUSATE SODIUM 100 MG: 100 CAPSULE, LIQUID FILLED ORAL at 09:18

## 2021-03-28 RX ADMIN — ASPIRIN 81 MG: 81 TABLET, CHEWABLE ORAL at 09:18

## 2021-03-28 RX ADMIN — HEPARIN SODIUM 13.1 UNITS/KG/HR: 10000 INJECTION, SOLUTION INTRAVENOUS at 19:23

## 2021-03-28 RX ADMIN — ATORVASTATIN CALCIUM 80 MG: 80 TABLET, FILM COATED ORAL at 17:02

## 2021-03-28 RX ADMIN — CHLORHEXIDINE GLUCONATE 0.12% ORAL RINSE 15 ML: 1.2 LIQUID ORAL at 09:19

## 2021-03-28 RX ADMIN — CARVEDILOL 6.25 MG: 6.25 TABLET, FILM COATED ORAL at 17:02

## 2021-03-28 RX ADMIN — AMLODIPINE BESYLATE 5 MG: 5 TABLET ORAL at 09:19

## 2021-03-28 RX ADMIN — MUPIROCIN 1 APPLICATION: 20 OINTMENT TOPICAL at 21:15

## 2021-03-28 RX ADMIN — CARVEDILOL 6.25 MG: 6.25 TABLET, FILM COATED ORAL at 09:18

## 2021-03-28 RX ADMIN — CHLORHEXIDINE GLUCONATE 0.12% ORAL RINSE 15 ML: 1.2 LIQUID ORAL at 21:15

## 2021-03-29 PROBLEM — Z86.16 HISTORY OF COVID-19: Status: ACTIVE | Noted: 2021-03-29

## 2021-03-29 LAB
ANION GAP SERPL CALCULATED.3IONS-SCNC: 9 MMOL/L (ref 4–13)
APTT PPP: 80 SECONDS (ref 23–37)
BUN SERPL-MCNC: 18 MG/DL (ref 5–25)
CALCIUM SERPL-MCNC: 9.1 MG/DL (ref 8.3–10.1)
CHLORIDE SERPL-SCNC: 109 MMOL/L (ref 100–108)
CO2 SERPL-SCNC: 24 MMOL/L (ref 21–32)
CREAT SERPL-MCNC: 0.79 MG/DL (ref 0.6–1.3)
GFR SERPL CREATININE-BSD FRML MDRD: 74 ML/MIN/1.73SQ M
GLUCOSE SERPL-MCNC: 104 MG/DL (ref 65–140)
GLUCOSE SERPL-MCNC: 108 MG/DL (ref 65–140)
GLUCOSE SERPL-MCNC: 119 MG/DL (ref 65–140)
GLUCOSE SERPL-MCNC: 119 MG/DL (ref 65–140)
GLUCOSE SERPL-MCNC: 122 MG/DL (ref 65–140)
GLUCOSE SERPL-MCNC: 182 MG/DL (ref 65–140)
POTASSIUM SERPL-SCNC: 4 MMOL/L (ref 3.5–5.3)
SODIUM SERPL-SCNC: 142 MMOL/L (ref 136–145)

## 2021-03-29 PROCEDURE — NC001 PR NO CHARGE: Performed by: THORACIC SURGERY (CARDIOTHORACIC VASCULAR SURGERY)

## 2021-03-29 PROCEDURE — 82948 REAGENT STRIP/BLOOD GLUCOSE: CPT

## 2021-03-29 PROCEDURE — 99232 SBSQ HOSP IP/OBS MODERATE 35: CPT | Performed by: GENERAL PRACTICE

## 2021-03-29 PROCEDURE — 99233 SBSQ HOSP IP/OBS HIGH 50: CPT | Performed by: INTERNAL MEDICINE

## 2021-03-29 PROCEDURE — 80048 BASIC METABOLIC PNL TOTAL CA: CPT | Performed by: GENERAL PRACTICE

## 2021-03-29 PROCEDURE — 85730 THROMBOPLASTIN TIME PARTIAL: CPT | Performed by: GENERAL PRACTICE

## 2021-03-29 RX ORDER — CARVEDILOL 12.5 MG/1
12.5 TABLET ORAL 2 TIMES DAILY WITH MEALS
Status: DISCONTINUED | OUTPATIENT
Start: 2021-03-29 | End: 2021-03-31 | Stop reason: HOSPADM

## 2021-03-29 RX ORDER — HEPARIN SODIUM 10000 [USP'U]/100ML
3-20 INJECTION, SOLUTION INTRAVENOUS
Status: DISCONTINUED | OUTPATIENT
Start: 2021-03-29 | End: 2021-03-29

## 2021-03-29 RX ADMIN — CARVEDILOL 12.5 MG: 12.5 TABLET, FILM COATED ORAL at 16:01

## 2021-03-29 RX ADMIN — MUPIROCIN 1 APPLICATION: 20 OINTMENT TOPICAL at 21:04

## 2021-03-29 RX ADMIN — CHLORHEXIDINE GLUCONATE 0.12% ORAL RINSE 15 ML: 1.2 LIQUID ORAL at 09:35

## 2021-03-29 RX ADMIN — ATORVASTATIN CALCIUM 80 MG: 80 TABLET, FILM COATED ORAL at 17:15

## 2021-03-29 RX ADMIN — CARVEDILOL 6.25 MG: 6.25 TABLET, FILM COATED ORAL at 09:34

## 2021-03-29 RX ADMIN — MUPIROCIN 1 APPLICATION: 20 OINTMENT TOPICAL at 09:35

## 2021-03-29 RX ADMIN — CHLORHEXIDINE GLUCONATE 0.12% ORAL RINSE 15 ML: 1.2 LIQUID ORAL at 21:05

## 2021-03-29 RX ADMIN — DOCUSATE SODIUM 100 MG: 100 CAPSULE, LIQUID FILLED ORAL at 09:33

## 2021-03-29 RX ADMIN — ASPIRIN 81 MG: 81 TABLET, CHEWABLE ORAL at 09:33

## 2021-03-29 RX ADMIN — AMLODIPINE BESYLATE 5 MG: 5 TABLET ORAL at 09:34

## 2021-03-29 RX ADMIN — PANTOPRAZOLE SODIUM 40 MG: 40 TABLET, DELAYED RELEASE ORAL at 06:40

## 2021-03-30 LAB
ABO GROUP BLD: NORMAL
BLD GP AB SCN SERPL QL: NEGATIVE
GLUCOSE SERPL-MCNC: 111 MG/DL (ref 65–140)
GLUCOSE SERPL-MCNC: 112 MG/DL (ref 65–140)
GLUCOSE SERPL-MCNC: 144 MG/DL (ref 65–140)
GLUCOSE SERPL-MCNC: 97 MG/DL (ref 65–140)
RH BLD: POSITIVE
SPECIMEN EXPIRATION DATE: NORMAL

## 2021-03-30 PROCEDURE — 86850 RBC ANTIBODY SCREEN: CPT | Performed by: PHYSICIAN ASSISTANT

## 2021-03-30 PROCEDURE — 86900 BLOOD TYPING SEROLOGIC ABO: CPT | Performed by: PHYSICIAN ASSISTANT

## 2021-03-30 PROCEDURE — 86901 BLOOD TYPING SEROLOGIC RH(D): CPT | Performed by: PHYSICIAN ASSISTANT

## 2021-03-30 PROCEDURE — NC001 PR NO CHARGE: Performed by: THORACIC SURGERY (CARDIOTHORACIC VASCULAR SURGERY)

## 2021-03-30 PROCEDURE — 99232 SBSQ HOSP IP/OBS MODERATE 35: CPT | Performed by: INTERNAL MEDICINE

## 2021-03-30 PROCEDURE — 82948 REAGENT STRIP/BLOOD GLUCOSE: CPT

## 2021-03-30 PROCEDURE — 86923 COMPATIBILITY TEST ELECTRIC: CPT

## 2021-03-30 RX ORDER — LANOLIN ALCOHOL/MO/W.PET/CERES
CREAM (GRAM) TOPICAL 2 TIMES DAILY
Status: DISCONTINUED | OUTPATIENT
Start: 2021-03-30 | End: 2021-03-31 | Stop reason: HOSPADM

## 2021-03-30 RX ORDER — ALBUTEROL SULFATE 90 UG/1
2 AEROSOL, METERED RESPIRATORY (INHALATION) EVERY 6 HOURS PRN
Status: DISCONTINUED | OUTPATIENT
Start: 2021-03-30 | End: 2021-03-31 | Stop reason: HOSPADM

## 2021-03-30 RX ORDER — HEPARIN SODIUM 1000 [USP'U]/ML
10000 INJECTION, SOLUTION INTRAVENOUS; SUBCUTANEOUS ONCE
Status: CANCELLED | OUTPATIENT
Start: 2021-03-31

## 2021-03-30 RX ORDER — CHLORHEXIDINE GLUCONATE 0.12 MG/ML
15 RINSE ORAL EVERY 12 HOURS SCHEDULED
Status: DISCONTINUED | OUTPATIENT
Start: 2021-03-30 | End: 2021-03-30

## 2021-03-30 RX ORDER — HEPARIN SODIUM 1000 [USP'U]/ML
400 INJECTION, SOLUTION INTRAVENOUS; SUBCUTANEOUS ONCE
Status: CANCELLED | OUTPATIENT
Start: 2021-03-31

## 2021-03-30 RX ADMIN — ATORVASTATIN CALCIUM 80 MG: 80 TABLET, FILM COATED ORAL at 17:24

## 2021-03-30 RX ADMIN — Medication: at 21:59

## 2021-03-30 RX ADMIN — SENNOSIDES 8.6 MG: 8.6 TABLET, FILM COATED ORAL at 21:59

## 2021-03-30 RX ADMIN — CARVEDILOL 12.5 MG: 12.5 TABLET, FILM COATED ORAL at 17:24

## 2021-03-30 RX ADMIN — AMLODIPINE BESYLATE 5 MG: 5 TABLET ORAL at 08:19

## 2021-03-30 RX ADMIN — CARVEDILOL 12.5 MG: 12.5 TABLET, FILM COATED ORAL at 08:23

## 2021-03-30 RX ADMIN — ASPIRIN 81 MG: 81 TABLET, CHEWABLE ORAL at 08:18

## 2021-03-30 RX ADMIN — Medication: at 12:42

## 2021-03-30 RX ADMIN — CHLORHEXIDINE GLUCONATE 0.12% ORAL RINSE 15 ML: 1.2 LIQUID ORAL at 21:59

## 2021-03-30 RX ADMIN — DOCUSATE SODIUM 100 MG: 100 CAPSULE, LIQUID FILLED ORAL at 08:19

## 2021-03-30 RX ADMIN — CHLORHEXIDINE GLUCONATE 0.12% ORAL RINSE 15 ML: 1.2 LIQUID ORAL at 08:19

## 2021-03-30 RX ADMIN — PANTOPRAZOLE SODIUM 40 MG: 40 TABLET, DELAYED RELEASE ORAL at 06:58

## 2021-03-31 ENCOUNTER — ANESTHESIA (INPATIENT)
Dept: PERIOP | Facility: HOSPITAL | Age: 76
DRG: 236 | End: 2021-03-31
Payer: COMMERCIAL

## 2021-03-31 ENCOUNTER — APPOINTMENT (INPATIENT)
Dept: NON INVASIVE DIAGNOSTICS | Facility: HOSPITAL | Age: 76
DRG: 236 | End: 2021-03-31
Payer: COMMERCIAL

## 2021-03-31 ENCOUNTER — APPOINTMENT (INPATIENT)
Dept: RADIOLOGY | Facility: HOSPITAL | Age: 76
DRG: 236 | End: 2021-03-31
Payer: COMMERCIAL

## 2021-03-31 ENCOUNTER — ANESTHESIA EVENT (INPATIENT)
Dept: PERIOP | Facility: HOSPITAL | Age: 76
DRG: 236 | End: 2021-03-31
Payer: COMMERCIAL

## 2021-03-31 PROBLEM — Z95.1 S/P CABG (CORONARY ARTERY BYPASS GRAFT): Status: ACTIVE | Noted: 2021-03-31

## 2021-03-31 LAB
ANION GAP SERPL CALCULATED.3IONS-SCNC: 7 MMOL/L (ref 4–13)
BASE EXCESS BLDA CALC-SCNC: -1 MMOL/L (ref -2–3)
BASE EXCESS BLDA CALC-SCNC: -1 MMOL/L (ref -2–3)
BASE EXCESS BLDA CALC-SCNC: -2 MMOL/L (ref -2–3)
BASE EXCESS BLDA CALC-SCNC: 1 MMOL/L (ref -2–3)
BASE EXCESS BLDA CALC-SCNC: 2 MMOL/L (ref -2–3)
BASE EXCESS BLDA CALC-SCNC: 3 MMOL/L (ref -2–3)
BUN SERPL-MCNC: 18 MG/DL (ref 5–25)
CA-I BLD-SCNC: 1.04 MMOL/L (ref 1.12–1.32)
CA-I BLD-SCNC: 1.04 MMOL/L (ref 1.12–1.32)
CA-I BLD-SCNC: 1.05 MMOL/L (ref 1.12–1.32)
CA-I BLD-SCNC: 1.26 MMOL/L (ref 1.12–1.32)
CALCIUM SERPL-MCNC: 8 MG/DL (ref 8.3–10.1)
CHLORIDE SERPL-SCNC: 115 MMOL/L (ref 100–108)
CO2 SERPL-SCNC: 23 MMOL/L (ref 21–32)
CREAT SERPL-MCNC: 0.95 MG/DL (ref 0.6–1.3)
FIO2 GAS DIL.REBREATH: 40 L
GFR SERPL CREATININE-BSD FRML MDRD: 59 ML/MIN/1.73SQ M
GLUCOSE SERPL-MCNC: 104 MG/DL (ref 65–140)
GLUCOSE SERPL-MCNC: 113 MG/DL (ref 65–140)
GLUCOSE SERPL-MCNC: 122 MG/DL (ref 65–140)
GLUCOSE SERPL-MCNC: 127 MG/DL (ref 65–140)
GLUCOSE SERPL-MCNC: 128 MG/DL (ref 65–140)
GLUCOSE SERPL-MCNC: 138 MG/DL (ref 65–140)
GLUCOSE SERPL-MCNC: 139 MG/DL (ref 65–140)
GLUCOSE SERPL-MCNC: 144 MG/DL (ref 65–140)
GLUCOSE SERPL-MCNC: 145 MG/DL (ref 65–140)
GLUCOSE SERPL-MCNC: 146 MG/DL (ref 65–140)
GLUCOSE SERPL-MCNC: 149 MG/DL (ref 65–140)
GLUCOSE SERPL-MCNC: 151 MG/DL (ref 65–140)
GLUCOSE SERPL-MCNC: 165 MG/DL (ref 65–140)
HCO3 BLDA-SCNC: 23.7 MMOL/L (ref 22–28)
HCO3 BLDA-SCNC: 23.9 MMOL/L (ref 22–28)
HCO3 BLDA-SCNC: 25.6 MMOL/L (ref 22–28)
HCO3 BLDA-SCNC: 26 MMOL/L (ref 22–28)
HCO3 BLDA-SCNC: 27.3 MMOL/L (ref 24–30)
HCO3 BLDA-SCNC: 28.4 MMOL/L (ref 22–28)
HCT VFR BLD AUTO: 35.2 % (ref 34.8–46.1)
HCT VFR BLD CALC: 21 % (ref 34.8–46.1)
HCT VFR BLD CALC: 25 % (ref 34.8–46.1)
HCT VFR BLD CALC: 26 % (ref 34.8–46.1)
HCT VFR BLD CALC: 27 % (ref 34.8–46.1)
HCT VFR BLD CALC: 27 % (ref 34.8–46.1)
HCT VFR BLD CALC: 32 % (ref 34.8–46.1)
HGB BLD-MCNC: 11.4 G/DL (ref 11.5–15.4)
HGB BLDA-MCNC: 10.9 G/DL (ref 11.5–15.4)
HGB BLDA-MCNC: 7.1 G/DL (ref 11.5–15.4)
HGB BLDA-MCNC: 8.5 G/DL (ref 11.5–15.4)
HGB BLDA-MCNC: 8.8 G/DL (ref 11.5–15.4)
HGB BLDA-MCNC: 9.2 G/DL (ref 11.5–15.4)
HGB BLDA-MCNC: 9.2 G/DL (ref 11.5–15.4)
KCT BLD-ACNC: 110 SEC (ref 89–137)
KCT BLD-ACNC: 121 SEC (ref 89–137)
KCT BLD-ACNC: 435 SEC (ref 89–137)
KCT BLD-ACNC: 490 SEC (ref 89–137)
KCT BLD-ACNC: 514 SEC (ref 89–137)
KCT BLD-ACNC: 525 SEC (ref 89–137)
KCT BLD-ACNC: 588 SEC (ref 89–137)
PCO2 BLD: 25 MMOL/L (ref 21–32)
PCO2 BLD: 25 MMOL/L (ref 21–32)
PCO2 BLD: 27 MMOL/L (ref 21–32)
PCO2 BLD: 27 MMOL/L (ref 21–32)
PCO2 BLD: 29 MMOL/L (ref 21–32)
PCO2 BLD: 30 MMOL/L (ref 21–32)
PCO2 BLD: 39.8 MM HG (ref 36–44)
PCO2 BLD: 42.4 MM HG (ref 36–44)
PCO2 BLD: 42.4 MM HG (ref 36–44)
PCO2 BLD: 47.9 MM HG (ref 42–50)
PCO2 BLD: 50 MM HG (ref 36–44)
PCO2 BLD: 52.7 MM HG (ref 36–44)
PH BLD: 7.29 [PH] (ref 7.35–7.45)
PH BLD: 7.36 [PH] (ref 7.35–7.45)
PH BLD: 7.36 [PH] (ref 7.35–7.45)
PH BLD: 7.36 [PH] (ref 7.3–7.4)
PH BLD: 7.38 [PH] (ref 7.35–7.45)
PH BLD: 7.4 [PH] (ref 7.35–7.45)
PLATELET # BLD AUTO: 133 THOUSANDS/UL (ref 149–390)
PLATELET # BLD AUTO: 154 THOUSANDS/UL (ref 149–390)
PMV BLD AUTO: 10.5 FL (ref 8.9–12.7)
PMV BLD AUTO: 10.6 FL (ref 8.9–12.7)
PO2 BLD: 195 MM HG (ref 75–129)
PO2 BLD: 231 MM HG (ref 75–129)
PO2 BLD: 297 MM HG (ref 75–129)
PO2 BLD: 345 MM HG (ref 75–129)
PO2 BLD: 41 MM HG (ref 35–45)
PO2 BLD: 81 MM HG (ref 75–129)
POTASSIUM BLD-SCNC: 4.1 MMOL/L (ref 3.5–5.3)
POTASSIUM BLD-SCNC: 4.3 MMOL/L (ref 3.5–5.3)
POTASSIUM BLD-SCNC: 4.7 MMOL/L (ref 3.5–5.3)
POTASSIUM BLD-SCNC: 4.9 MMOL/L (ref 3.5–5.3)
POTASSIUM BLD-SCNC: 4.9 MMOL/L (ref 3.5–5.3)
POTASSIUM BLD-SCNC: 5.4 MMOL/L (ref 3.5–5.3)
POTASSIUM SERPL-SCNC: 4.2 MMOL/L (ref 3.5–5.3)
POTASSIUM SERPL-SCNC: 4.2 MMOL/L (ref 3.5–5.3)
SAO2 % BLD FROM PO2: 100 % (ref 60–85)
SAO2 % BLD FROM PO2: 74 % (ref 60–85)
SAO2 % BLD FROM PO2: 96 % (ref 60–85)
SODIUM BLD-SCNC: 139 MMOL/L (ref 136–145)
SODIUM BLD-SCNC: 140 MMOL/L (ref 136–145)
SODIUM BLD-SCNC: 141 MMOL/L (ref 136–145)
SODIUM BLD-SCNC: 142 MMOL/L (ref 136–145)
SODIUM SERPL-SCNC: 145 MMOL/L (ref 136–145)
SPECIMEN SOURCE: ABNORMAL
SPECIMEN SOURCE: NORMAL
SPECIMEN SOURCE: NORMAL

## 2021-03-31 PROCEDURE — 82330 ASSAY OF CALCIUM: CPT

## 2021-03-31 PROCEDURE — 82947 ASSAY GLUCOSE BLOOD QUANT: CPT

## 2021-03-31 PROCEDURE — 33533 CABG ARTERIAL SINGLE: CPT | Performed by: THORACIC SURGERY (CARDIOTHORACIC VASCULAR SURGERY)

## 2021-03-31 PROCEDURE — 82803 BLOOD GASES ANY COMBINATION: CPT

## 2021-03-31 PROCEDURE — 85018 HEMOGLOBIN: CPT | Performed by: PHYSICIAN ASSISTANT

## 2021-03-31 PROCEDURE — 30233N1 TRANSFUSION OF NONAUTOLOGOUS RED BLOOD CELLS INTO PERIPHERAL VEIN, PERCUTANEOUS APPROACH: ICD-10-PCS | Performed by: THORACIC SURGERY (CARDIOTHORACIC VASCULAR SURGERY)

## 2021-03-31 PROCEDURE — 93355 ECHO TRANSESOPHAGEAL (TEE): CPT

## 2021-03-31 PROCEDURE — 02HV33Z INSERTION OF INFUSION DEVICE INTO SUPERIOR VENA CAVA, PERCUTANEOUS APPROACH: ICD-10-PCS | Performed by: ANESTHESIOLOGY

## 2021-03-31 PROCEDURE — 94002 VENT MGMT INPAT INIT DAY: CPT

## 2021-03-31 PROCEDURE — 85049 AUTOMATED PLATELET COUNT: CPT | Performed by: THORACIC SURGERY (CARDIOTHORACIC VASCULAR SURGERY)

## 2021-03-31 PROCEDURE — 84132 ASSAY OF SERUM POTASSIUM: CPT

## 2021-03-31 PROCEDURE — 85347 COAGULATION TIME ACTIVATED: CPT

## 2021-03-31 PROCEDURE — 82948 REAGENT STRIP/BLOOD GLUCOSE: CPT

## 2021-03-31 PROCEDURE — 94760 N-INVAS EAR/PLS OXIMETRY 1: CPT

## 2021-03-31 PROCEDURE — 85014 HEMATOCRIT: CPT

## 2021-03-31 PROCEDURE — 33519 CABG ARTERY-VEIN THREE: CPT | Performed by: THORACIC SURGERY (CARDIOTHORACIC VASCULAR SURGERY)

## 2021-03-31 PROCEDURE — 5A1223Z PERFORMANCE OF CARDIAC PACING, CONTINUOUS: ICD-10-PCS | Performed by: THORACIC SURGERY (CARDIOTHORACIC VASCULAR SURGERY)

## 2021-03-31 PROCEDURE — 30233N0 TRANSFUSION OF AUTOLOGOUS RED BLOOD CELLS INTO PERIPHERAL VEIN, PERCUTANEOUS APPROACH: ICD-10-PCS | Performed by: THORACIC SURGERY (CARDIOTHORACIC VASCULAR SURGERY)

## 2021-03-31 PROCEDURE — 02100Z9 BYPASS CORONARY ARTERY, ONE ARTERY FROM LEFT INTERNAL MAMMARY, OPEN APPROACH: ICD-10-PCS | Performed by: THORACIC SURGERY (CARDIOTHORACIC VASCULAR SURGERY)

## 2021-03-31 PROCEDURE — 85014 HEMATOCRIT: CPT | Performed by: PHYSICIAN ASSISTANT

## 2021-03-31 PROCEDURE — 99233 SBSQ HOSP IP/OBS HIGH 50: CPT | Performed by: ANESTHESIOLOGY

## 2021-03-31 PROCEDURE — 5A1221Z PERFORMANCE OF CARDIAC OUTPUT, CONTINUOUS: ICD-10-PCS | Performed by: THORACIC SURGERY (CARDIOTHORACIC VASCULAR SURGERY)

## 2021-03-31 PROCEDURE — 84295 ASSAY OF SERUM SODIUM: CPT

## 2021-03-31 PROCEDURE — P9016 RBC LEUKOCYTES REDUCED: HCPCS

## 2021-03-31 PROCEDURE — 84132 ASSAY OF SERUM POTASSIUM: CPT | Performed by: PHYSICIAN ASSISTANT

## 2021-03-31 PROCEDURE — 85049 AUTOMATED PLATELET COUNT: CPT | Performed by: PHYSICIAN ASSISTANT

## 2021-03-31 PROCEDURE — 33508 ENDOSCOPIC VEIN HARVEST: CPT | Performed by: THORACIC SURGERY (CARDIOTHORACIC VASCULAR SURGERY)

## 2021-03-31 PROCEDURE — 06BQ4ZZ EXCISION OF LEFT SAPHENOUS VEIN, PERCUTANEOUS ENDOSCOPIC APPROACH: ICD-10-PCS | Performed by: THORACIC SURGERY (CARDIOTHORACIC VASCULAR SURGERY)

## 2021-03-31 PROCEDURE — 80048 BASIC METABOLIC PNL TOTAL CA: CPT | Performed by: PHYSICIAN ASSISTANT

## 2021-03-31 PROCEDURE — 71045 X-RAY EXAM CHEST 1 VIEW: CPT

## 2021-03-31 PROCEDURE — 93005 ELECTROCARDIOGRAM TRACING: CPT

## 2021-03-31 PROCEDURE — 021209W BYPASS CORONARY ARTERY, THREE ARTERIES FROM AORTA WITH AUTOLOGOUS VENOUS TISSUE, OPEN APPROACH: ICD-10-PCS | Performed by: THORACIC SURGERY (CARDIOTHORACIC VASCULAR SURGERY)

## 2021-03-31 PROCEDURE — 82330 ASSAY OF CALCIUM: CPT | Performed by: THORACIC SURGERY (CARDIOTHORACIC VASCULAR SURGERY)

## 2021-03-31 DEVICE — MARKER CORONARY BYPASS VOSS GRAFT: Type: IMPLANTABLE DEVICE | Site: HEART | Status: FUNCTIONAL

## 2021-03-31 RX ORDER — SODIUM CHLORIDE 9 MG/ML
INJECTION, SOLUTION INTRAVENOUS CONTINUOUS PRN
Status: DISCONTINUED | OUTPATIENT
Start: 2021-03-31 | End: 2021-03-31

## 2021-03-31 RX ORDER — OXYCODONE HYDROCHLORIDE 5 MG/1
5 TABLET ORAL EVERY 4 HOURS PRN
Status: DISCONTINUED | OUTPATIENT
Start: 2021-03-31 | End: 2021-04-05 | Stop reason: HOSPADM

## 2021-03-31 RX ORDER — POTASSIUM CHLORIDE 14.9 MG/ML
20 INJECTION INTRAVENOUS
Status: DISCONTINUED | OUTPATIENT
Start: 2021-03-31 | End: 2021-04-01

## 2021-03-31 RX ORDER — HEPARIN SODIUM 1000 [USP'U]/ML
INJECTION, SOLUTION INTRAVENOUS; SUBCUTANEOUS AS NEEDED
Status: DISCONTINUED | OUTPATIENT
Start: 2021-03-31 | End: 2021-03-31

## 2021-03-31 RX ORDER — FUROSEMIDE 10 MG/ML
40 INJECTION INTRAMUSCULAR; INTRAVENOUS EVERY 6 HOURS PRN
Status: DISCONTINUED | OUTPATIENT
Start: 2021-03-31 | End: 2021-04-01

## 2021-03-31 RX ORDER — CEFAZOLIN SODIUM 1 G/50ML
1000 SOLUTION INTRAVENOUS EVERY 8 HOURS
Status: COMPLETED | OUTPATIENT
Start: 2021-03-31 | End: 2021-04-01

## 2021-03-31 RX ORDER — AMIODARONE HYDROCHLORIDE 200 MG/1
200 TABLET ORAL EVERY 8 HOURS SCHEDULED
Status: DISCONTINUED | OUTPATIENT
Start: 2021-03-31 | End: 2021-04-05 | Stop reason: HOSPADM

## 2021-03-31 RX ORDER — POLYETHYLENE GLYCOL 3350 17 G/17G
17 POWDER, FOR SOLUTION ORAL DAILY
Status: DISCONTINUED | OUTPATIENT
Start: 2021-04-01 | End: 2021-04-05 | Stop reason: HOSPADM

## 2021-03-31 RX ORDER — HYDROMORPHONE HCL/PF 1 MG/ML
0.5 SYRINGE (ML) INJECTION EVERY 2 HOUR PRN
Status: DISCONTINUED | OUTPATIENT
Start: 2021-03-31 | End: 2021-04-01

## 2021-03-31 RX ORDER — MILRINONE LACTATE 0.2 MG/ML
0.13 INJECTION, SOLUTION INTRAVENOUS CONTINUOUS
Status: DISCONTINUED | OUTPATIENT
Start: 2021-03-31 | End: 2021-04-01

## 2021-03-31 RX ORDER — FENTANYL CITRATE 50 UG/ML
50 INJECTION, SOLUTION INTRAMUSCULAR; INTRAVENOUS
Status: DISCONTINUED | OUTPATIENT
Start: 2021-03-31 | End: 2021-04-01

## 2021-03-31 RX ORDER — MAGNESIUM SULFATE HEPTAHYDRATE 40 MG/ML
2 INJECTION, SOLUTION INTRAVENOUS ONCE
Status: COMPLETED | OUTPATIENT
Start: 2021-03-31 | End: 2021-03-31

## 2021-03-31 RX ORDER — CEFAZOLIN SODIUM 2 G/50ML
SOLUTION INTRAVENOUS AS NEEDED
Status: DISCONTINUED | OUTPATIENT
Start: 2021-03-31 | End: 2021-03-31

## 2021-03-31 RX ORDER — CALCIUM CHLORIDE 100 MG/ML
1 INJECTION INTRAVENOUS; INTRAVENTRICULAR ONCE
Status: DISCONTINUED | OUTPATIENT
Start: 2021-03-31 | End: 2021-04-01

## 2021-03-31 RX ORDER — VANCOMYCIN HYDROCHLORIDE 1 G/20ML
INJECTION, POWDER, LYOPHILIZED, FOR SOLUTION INTRAVENOUS AS NEEDED
Status: DISCONTINUED | OUTPATIENT
Start: 2021-03-31 | End: 2021-03-31 | Stop reason: HOSPADM

## 2021-03-31 RX ORDER — FENTANYL CITRATE 50 UG/ML
50 INJECTION, SOLUTION INTRAMUSCULAR; INTRAVENOUS ONCE
Status: DISCONTINUED | OUTPATIENT
Start: 2021-03-31 | End: 2021-04-01

## 2021-03-31 RX ORDER — CHLORHEXIDINE GLUCONATE 0.12 MG/ML
15 RINSE ORAL 2 TIMES DAILY
Status: DISCONTINUED | OUTPATIENT
Start: 2021-03-31 | End: 2021-04-01

## 2021-03-31 RX ORDER — SODIUM CHLORIDE 450 MG/100ML
20 INJECTION, SOLUTION INTRAVENOUS CONTINUOUS
Status: DISCONTINUED | OUTPATIENT
Start: 2021-03-31 | End: 2021-04-03

## 2021-03-31 RX ORDER — PROTAMINE SULFATE 10 MG/ML
INJECTION, SOLUTION INTRAVENOUS AS NEEDED
Status: DISCONTINUED | OUTPATIENT
Start: 2021-03-31 | End: 2021-03-31

## 2021-03-31 RX ORDER — ACETAMINOPHEN 325 MG/1
975 TABLET ORAL EVERY 8 HOURS
Status: DISCONTINUED | OUTPATIENT
Start: 2021-03-31 | End: 2021-04-05 | Stop reason: HOSPADM

## 2021-03-31 RX ORDER — BISACODYL 10 MG
10 SUPPOSITORY, RECTAL RECTAL DAILY PRN
Status: DISCONTINUED | OUTPATIENT
Start: 2021-03-31 | End: 2021-04-05 | Stop reason: HOSPADM

## 2021-03-31 RX ORDER — ASPIRIN 325 MG
325 TABLET ORAL DAILY
Status: DISCONTINUED | OUTPATIENT
Start: 2021-04-01 | End: 2021-04-05 | Stop reason: HOSPADM

## 2021-03-31 RX ORDER — ONDANSETRON 2 MG/ML
4 INJECTION INTRAMUSCULAR; INTRAVENOUS EVERY 6 HOURS PRN
Status: DISCONTINUED | OUTPATIENT
Start: 2021-03-31 | End: 2021-04-05 | Stop reason: HOSPADM

## 2021-03-31 RX ORDER — ATORVASTATIN CALCIUM 80 MG/1
80 TABLET, FILM COATED ORAL
Status: DISCONTINUED | OUTPATIENT
Start: 2021-03-31 | End: 2021-04-05 | Stop reason: HOSPADM

## 2021-03-31 RX ORDER — MILRINONE LACTATE 0.2 MG/ML
INJECTION, SOLUTION INTRAVENOUS CONTINUOUS PRN
Status: DISCONTINUED | OUTPATIENT
Start: 2021-03-31 | End: 2021-03-31

## 2021-03-31 RX ORDER — LIDOCAINE HYDROCHLORIDE 10 MG/ML
INJECTION, SOLUTION EPIDURAL; INFILTRATION; INTRACAUDAL; PERINEURAL
Status: COMPLETED | OUTPATIENT
Start: 2021-03-31 | End: 2021-03-31

## 2021-03-31 RX ORDER — ACETAMINOPHEN 650 MG/1
650 SUPPOSITORY RECTAL EVERY 4 HOURS PRN
Status: DISCONTINUED | OUTPATIENT
Start: 2021-03-31 | End: 2021-04-05 | Stop reason: HOSPADM

## 2021-03-31 RX ORDER — CEFAZOLIN SODIUM 2 G/50ML
2000 SOLUTION INTRAVENOUS ONCE
Status: DISCONTINUED | OUTPATIENT
Start: 2021-03-31 | End: 2021-03-31 | Stop reason: HOSPADM

## 2021-03-31 RX ORDER — FONDAPARINUX SODIUM 2.5 MG/.5ML
2.5 INJECTION SUBCUTANEOUS DAILY
Status: DISCONTINUED | OUTPATIENT
Start: 2021-04-01 | End: 2021-04-05 | Stop reason: HOSPADM

## 2021-03-31 RX ORDER — ROCURONIUM BROMIDE 10 MG/ML
INJECTION, SOLUTION INTRAVENOUS AS NEEDED
Status: DISCONTINUED | OUTPATIENT
Start: 2021-03-31 | End: 2021-03-31

## 2021-03-31 RX ORDER — MIDAZOLAM HYDROCHLORIDE 2 MG/2ML
INJECTION, SOLUTION INTRAMUSCULAR; INTRAVENOUS AS NEEDED
Status: DISCONTINUED | OUTPATIENT
Start: 2021-03-31 | End: 2021-03-31

## 2021-03-31 RX ORDER — SODIUM CHLORIDE, SODIUM LACTATE, POTASSIUM CHLORIDE, CALCIUM CHLORIDE 600; 310; 30; 20 MG/100ML; MG/100ML; MG/100ML; MG/100ML
INJECTION, SOLUTION INTRAVENOUS CONTINUOUS PRN
Status: DISCONTINUED | OUTPATIENT
Start: 2021-03-31 | End: 2021-03-31

## 2021-03-31 RX ORDER — FENTANYL CITRATE 50 UG/ML
INJECTION, SOLUTION INTRAMUSCULAR; INTRAVENOUS AS NEEDED
Status: DISCONTINUED | OUTPATIENT
Start: 2021-03-31 | End: 2021-03-31

## 2021-03-31 RX ORDER — AMINOCAPROIC ACID 250 MG/ML
INJECTION, SOLUTION INTRAVENOUS AS NEEDED
Status: DISCONTINUED | OUTPATIENT
Start: 2021-03-31 | End: 2021-03-31

## 2021-03-31 RX ORDER — POTASSIUM CHLORIDE 14.9 MG/ML
20 INJECTION INTRAVENOUS ONCE AS NEEDED
Status: DISCONTINUED | OUTPATIENT
Start: 2021-03-31 | End: 2021-04-01

## 2021-03-31 RX ORDER — PROPOFOL 10 MG/ML
INJECTION, EMULSION INTRAVENOUS AS NEEDED
Status: DISCONTINUED | OUTPATIENT
Start: 2021-03-31 | End: 2021-03-31

## 2021-03-31 RX ORDER — PANTOPRAZOLE SODIUM 40 MG/1
40 TABLET, DELAYED RELEASE ORAL
Status: DISCONTINUED | OUTPATIENT
Start: 2021-04-01 | End: 2021-04-05 | Stop reason: HOSPADM

## 2021-03-31 RX ORDER — OXYCODONE HYDROCHLORIDE 5 MG/1
2.5 TABLET ORAL EVERY 4 HOURS PRN
Status: DISCONTINUED | OUTPATIENT
Start: 2021-03-31 | End: 2021-04-05 | Stop reason: HOSPADM

## 2021-03-31 RX ORDER — ALBUMIN, HUMAN INJ 5% 5 %
12.5 SOLUTION INTRAVENOUS ONCE
Status: COMPLETED | OUTPATIENT
Start: 2021-03-31 | End: 2021-03-31

## 2021-03-31 RX ADMIN — HYDROMORPHONE HYDROCHLORIDE 0.5 MG: 1 INJECTION, SOLUTION INTRAMUSCULAR; INTRAVENOUS; SUBCUTANEOUS at 18:25

## 2021-03-31 RX ADMIN — LIDOCAINE HYDROCHLORIDE 1 ML: 10 INJECTION, SOLUTION EPIDURAL; INFILTRATION; INTRACAUDAL; PERINEURAL at 15:09

## 2021-03-31 RX ADMIN — PROPOFOL 50 MG: 10 INJECTION, EMULSION INTRAVENOUS at 16:36

## 2021-03-31 RX ADMIN — PHENYLEPHRINE HYDROCHLORIDE 100 MCG: 10 INJECTION INTRAVENOUS at 12:55

## 2021-03-31 RX ADMIN — Medication 12.5 MG: at 09:39

## 2021-03-31 RX ADMIN — OXYCODONE HYDROCHLORIDE 5 MG: 5 TABLET ORAL at 21:08

## 2021-03-31 RX ADMIN — FENTANYL CITRATE 500 MCG: 50 INJECTION INTRAMUSCULAR; INTRAVENOUS at 12:37

## 2021-03-31 RX ADMIN — SODIUM CHLORIDE 5 UNITS/HR: 9 INJECTION, SOLUTION INTRAVENOUS at 15:51

## 2021-03-31 RX ADMIN — PHENYLEPHRINE HYDROCHLORIDE 100 MCG/MIN: 10 INJECTION INTRAVENOUS at 16:39

## 2021-03-31 RX ADMIN — NICARDIPINE HYDROCHLORIDE 2.5 MG/HR: 2.5 INJECTION, SOLUTION INTRAVENOUS at 19:46

## 2021-03-31 RX ADMIN — MILRINONE LACTATE IN DEXTROSE 0.25 MCG/KG/MIN: 200 INJECTION, SOLUTION INTRAVENOUS at 15:50

## 2021-03-31 RX ADMIN — SODIUM CHLORIDE 3 UNITS/HR: 9 INJECTION, SOLUTION INTRAVENOUS at 17:30

## 2021-03-31 RX ADMIN — CEFAZOLIN SODIUM 2000 MG: 2 SOLUTION INTRAVENOUS at 13:20

## 2021-03-31 RX ADMIN — SODIUM CHLORIDE: 0.9 INJECTION, SOLUTION INTRAVENOUS at 11:59

## 2021-03-31 RX ADMIN — MUPIROCIN 1 APPLICATION: 20 OINTMENT TOPICAL at 21:09

## 2021-03-31 RX ADMIN — CEFAZOLIN SODIUM 2000 MG: 2 SOLUTION INTRAVENOUS at 17:00

## 2021-03-31 RX ADMIN — EPINEPHRINE 2 MCG/MIN: 1 INJECTION, SOLUTION, CONCENTRATE INTRAVENOUS at 17:30

## 2021-03-31 RX ADMIN — MILRINONE LACTATE IN DEXTROSE 0.25 MCG/KG/MIN: 200 INJECTION, SOLUTION INTRAVENOUS at 17:30

## 2021-03-31 RX ADMIN — MUPIROCIN 1 APPLICATION: 20 OINTMENT TOPICAL at 09:39

## 2021-03-31 RX ADMIN — VASOPRESSIN 0.04 UNITS/MIN: 20 INJECTION INTRAVENOUS at 17:30

## 2021-03-31 RX ADMIN — ACETAMINOPHEN 975 MG: 325 TABLET, FILM COATED ORAL at 21:09

## 2021-03-31 RX ADMIN — HEPARIN SODIUM 38100 UNITS: 1000 INJECTION INTRAVENOUS; SUBCUTANEOUS at 14:23

## 2021-03-31 RX ADMIN — AMIODARONE HYDROCHLORIDE 200 MG: 200 TABLET ORAL at 21:08

## 2021-03-31 RX ADMIN — PHENYLEPHRINE HYDROCHLORIDE 25 MCG/MIN: 10 INJECTION INTRAVENOUS at 17:30

## 2021-03-31 RX ADMIN — ALBUMIN (HUMAN) 12.5 G: 12.5 INJECTION, SOLUTION INTRAVENOUS at 22:29

## 2021-03-31 RX ADMIN — MIDAZOLAM 2 MG: 1 INJECTION INTRAMUSCULAR; INTRAVENOUS at 12:00

## 2021-03-31 RX ADMIN — AMINOCAPROIC ACID 1000 MG/HR: 250 INJECTION, SOLUTION INTRAVENOUS at 12:58

## 2021-03-31 RX ADMIN — PANTOPRAZOLE SODIUM 40 MG: 40 TABLET, DELAYED RELEASE ORAL at 06:18

## 2021-03-31 RX ADMIN — CEFAZOLIN SODIUM 1000 MG: 1 SOLUTION INTRAVENOUS at 22:29

## 2021-03-31 RX ADMIN — PHENYLEPHRINE HYDROCHLORIDE 100 MCG: 10 INJECTION INTRAVENOUS at 12:53

## 2021-03-31 RX ADMIN — PHENYLEPHRINE HYDROCHLORIDE 100 MCG: 10 INJECTION INTRAVENOUS at 12:51

## 2021-03-31 RX ADMIN — SODIUM CHLORIDE 20 ML/HR: 0.45 INJECTION, SOLUTION INTRAVENOUS at 17:30

## 2021-03-31 RX ADMIN — FENTANYL CITRATE 500 MCG: 50 INJECTION INTRAMUSCULAR; INTRAVENOUS at 13:36

## 2021-03-31 RX ADMIN — CHLORHEXIDINE GLUCONATE 0.12% ORAL RINSE 15 ML: 1.2 LIQUID ORAL at 09:39

## 2021-03-31 RX ADMIN — ROCURONIUM BROMIDE 100 MG: 50 INJECTION, SOLUTION INTRAVENOUS at 12:37

## 2021-03-31 RX ADMIN — VASOPRESSIN 0.04 UNITS/MIN: 20 INJECTION INTRAVENOUS at 16:32

## 2021-03-31 RX ADMIN — MIDAZOLAM 8 MG: 1 INJECTION INTRAMUSCULAR; INTRAVENOUS at 12:37

## 2021-03-31 RX ADMIN — PROPOFOL 50 MG: 10 INJECTION, EMULSION INTRAVENOUS at 16:34

## 2021-03-31 RX ADMIN — AMINOCAPROIC ACID 5 G: 250 INJECTION, SOLUTION INTRAVENOUS at 12:58

## 2021-03-31 RX ADMIN — HYDROMORPHONE HYDROCHLORIDE 0.5 MG: 1 INJECTION, SOLUTION INTRAMUSCULAR; INTRAVENOUS; SUBCUTANEOUS at 22:31

## 2021-03-31 RX ADMIN — SODIUM CHLORIDE, SODIUM LACTATE, POTASSIUM CHLORIDE, AND CALCIUM CHLORIDE: .6; .31; .03; .02 INJECTION, SOLUTION INTRAVENOUS at 12:57

## 2021-03-31 RX ADMIN — MAGNESIUM SULFATE HEPTAHYDRATE 2 G: 40 INJECTION, SOLUTION INTRAVENOUS at 17:29

## 2021-03-31 RX ADMIN — Medication 2 MCG/MIN: at 16:07

## 2021-03-31 RX ADMIN — Medication: at 09:50

## 2021-03-31 RX ADMIN — PROTAMINE SULFATE 380 MG: 10 INJECTION, SOLUTION INTRAVENOUS at 16:20

## 2021-03-31 RX ADMIN — ASPIRIN 81 MG: 81 TABLET, CHEWABLE ORAL at 09:40

## 2021-03-31 NOTE — ANESTHESIA PREPROCEDURE EVALUATION
Procedure:  CORONARY ARTERY BYPASS GRAFT (CABG) 3-4 VESSELS, EVH (N/A Chest)  TRANSESOPHAGEAL ECHOCARDIOGRAM (JUNIOR) (N/A Esophagus)    Relevant Problems   CARDIO   (+) Coronary artery disease involving native coronary artery of native heart with angina pectoris (HCC)   (+) Hypertensive urgency   (+) NSTEMI (non-ST elevated myocardial infarction) ([facility])   (+) SVT (supraventricular tachycardia) (HCC)      GI/HEPATIC   (+) GERD (gastroesophageal reflux disease)      NEURO/PSYCH   (+) History of COVID-19        Physical Exam    Airway    Mallampati score: I  TM Distance: >3 FB  Neck ROM: full     Dental       Cardiovascular      Pulmonary      Other Findings     LEFT VENTRICLE: Size was normal  Systolic function was at the lower limits of normal  Ejection fraction was estimated to be 50 %  There was severe hypokinesis of the basal-mid inferior and basal-mid inferolateral wall(s)  Wall thickness  was normal  DOPPLER: Doppler parameters were consistent with abnormal left ventricular relaxation (grade 1 diastolic dysfunction)      RIGHT VENTRICLE: The size was normal  Systolic function was normal  Wall thickness was normal      LEFT ATRIUM: Size was normal      RIGHT ATRIUM: Size was normal      MITRAL VALVE: There was mild annular calcification  Valve structure was normal  There was normal leaflet separation  DOPPLER: The transmitral velocity was within the normal range  There was no evidence for stenosis  There was mild  regurgitation      AORTIC VALVE: The valve was trileaflet  Leaflets exhibited mild calcification, lower normal cuspal separation, and sclerosis  DOPPLER: Transaortic velocity was minimally increased  There was no evidence for stenosis  There was no  significant regurgitation      TRICUSPID VALVE: The valve structure was normal  There was normal leaflet separation  DOPPLER: The transtricuspid velocity was within the normal range  There was no evidence for stenosis   There was trace regurgitation      PULMONIC VALVE: Leaflets exhibited normal thickness, no calcification, and normal cuspal separation  DOPPLER: The transpulmonic velocity was within the normal range  There was no significant regurgitation      PERICARDIUM: There was no pericardial effusion  The pericardium was normal in appearance      AORTA: The root exhibited normal size      SYSTEMIC VEINS: IVC: The inferior vena cava was normal in size  Respirophasic changes were normal        Anesthesia Plan  ASA Score- 4     Anesthesia Type- general with ASA Monitors  Additional Monitors: pulmonary artery catheter, central venous line and arterial line  Airway Plan:           Plan Factors-        Patient is not a current smoker  Patient did not smoke on day of surgery  Induction- intravenous  Postoperative Plan- Plan for postoperative opioid use  Informed Consent- Anesthetic plan and risks discussed with patient

## 2021-03-31 NOTE — ANESTHESIA PROCEDURE NOTES
Arterial Line Insertion  Performed by: Pippa Serra MD  Authorized by: Pippa Serra MD   Consent: Written consent obtained  Risks and benefits: risks, benefits and alternatives were discussed  Consent given by: patient  Patient identity confirmed: arm band and hospital-assigned identification number  Preparation: Patient was prepped and draped in the usual sterile fashion  Indications: multiple ABGs and hemodynamic monitoring  Orientation:  Right (Right brachial under US guidance)  Location: brachial artery (several attempts at left radial, then right brachial without results)lidocaine (PF) (XYLOCAINE-MPF) 1 % infiltration, 1 mL  Sedation:  Patient sedated: yes  Sedatives: midazolam  Vitals: Vital signs were monitored during sedation      Procedure Details:  Needle gauge: 20  Seldinger technique: Seldinger technique used  Number of attempts: 1    Post-procedure:  Post-procedure: chlorhexidine patch applied and dressing applied  Waveform: good waveform  Post-procedure CNS: normal  Patient tolerance: patient tolerated the procedure well with no immediate complications

## 2021-03-31 NOTE — ANESTHESIA PROCEDURE NOTES
Procedure Performed: JUNIOR Anesthesia  Start Time:  3/31/2021 1:32 PM        Preanesthesia Checklist    Patient identified, IV assessed, risks and benefits discussed, monitors and equipment assessed, procedure being performed at surgeon's request and anesthesia consent obtained  Procedure    Diagnostic Indications for JUNIOR:  hemodynamic monitoring  Type of JUNIOR: complete JUNIOR with interpretation  Images Saved: ultrasound permanent image saved  Physician Requesting Echo: Cameron Gonzalez MD  Location performed: OR  Intubated  Bite block not placed  Heart visualized  Insertion of JUNIOR Probe:  Atraumatic  Probe Type:  Multiplane and epiaortic  Modalities:  3D, color flow mapping and continuous wave Doppler  Echocardiographic and Doppler Measurements    PREPROCEDURE    LEFT VENTRICLE:  Systolic Function: moderately impaired  Ejection Fraction: 40-45%  Cavity size: normal  Regional Wall Motion Abnormalities: infereior & inferolateral walls  RIGHT VENTRICLE:  Systolic Function: mildly impaired  AORTIC VALVE:  Leaflets: trileaflet  Leaflet motions normal and normal  Stenosis: mild  Mean Gradient: 7 mmHg  Peak Gradient: 14 mmHg  Maximum Velocity: 2 m/s  Area: 1 85 cm²  Regurgitation: trace  MITRAL VALVE:  Leaflets: normal  Leaflet Motions: normal  Regurgitation: mild  Stenosis: none  TRICUSPID VALVE:  Leaflets: normal  Leaflet Motions: normal  Stenosis: none  Regurgitation: trace  PULMONIC VALVE:  Leaflets: unable to visualize heart  ASCENDING AORTA:  Size:  normal  Dissection not present  AORTIC ARCH:  Size:  normal  dissection not present  Grade 3: atheroma protruding < 0 5 cm into lumen  DESCENDING AORTA:  Size: normal  Dissection not present  Grade 1: normal to mild intimal thickening          RIGHT ATRIUM:  Size:  normal  No spontaneous echo contrast     LEFT ATRIUM:  Size: normal  No spontaneous echo contrast     LEFT ATRIAL APPENDAGE:  Size: normal  No spontaneous echo contrast         ATRIAL SEPTUM:  Intra-atrial septal morphology: normal          VENTRICULAR SEPTUM:  Intra-ventricular septum morphology: normal          EPIAORTIC:  Plaque Thickness: 0-5 mm  OTHER FINDINGS:  Pericardium:  normal  Pleural Effusion:  none  POSTPROCEDURE    LEFT VENTRICLE: Unchanged   RIGHT VENTRICLE: Unchanged   AORTIC VALVE: Unchanged   MITRAL VALVE: Unchanged   TRICUSPID VALVE: Unchanged   ATRIA: Unchanged   AORTA: Unchanged   Dissection: Dissection not present  REMOVAL:  Probe Removal: atraumatic

## 2021-03-31 NOTE — ANESTHESIA PROCEDURE NOTES
Introducer/Welch-Antonella  Performed by: Melly Villalpando MD  Authorized by: Melly Villalpando MD     Date/Time: 3/31/2021 12:42 PM  Consent: Written consent obtained    Risks and benefits: risks, benefits and alternatives were discussed  Consent given by: patient  Patient identity confirmed: arm band and hospital-assigned identification number  Indications: vascular access and central pressure monitoring  Location details: right internal jugular  Catheter size: 9 Fr  Patient position: Trendelenburg    Sedation:  Patient sedated: no    Assessment: blood return through all ports,  free fluid flow,  placement verified by x-ray and no pneumothorax on x-ray  Preparation: skin prepped with 2% chlorhexidine  Skin prep agent dried: skin prep agent completely dried prior to procedure  Sterile barriers: all five maximum sterile barriers used - cap, mask, sterile gown, sterile gloves, and large sterile sheet  Hand hygiene: hand hygiene performed prior to central venous catheter insertion  Ultrasound guidance: yes  Pre-procedure: landmarks identified  Number of attempts: 1  Successful placement: yes  Post-procedure: line sutured and dressing applied  Patient tolerance: patient tolerated the procedure well with no immediate complications

## 2021-03-31 NOTE — ANESTHESIA PROCEDURE NOTES
Central Line Insertion  Performed by: Joanne Posada MD  Authorized by: Joanne Posada MD     Date/Time: 3/31/2021 12:42 PM  Catheter Type:  triple lumen  Consent: Written consent obtained    Risks and benefits: risks, benefits and alternatives were discussed  Consent given by: patient  Patient identity confirmed: arm band and hospital-assigned identification number  Indications: vascular access  Location details: right internal jugular  Catheter size: 7 Fr  Patient position: Trendelenburg    Sedation:  Patient sedated: no    Assessment: placement verified by x-ray,  no pneumothorax on x-ray,  free fluid flow and blood return through all ports  Preparation: skin prepped with 2% chlorhexidine  Skin prep agent dried: skin prep agent completely dried prior to procedure  Sterile barriers: all five maximum sterile barriers used - cap, mask, sterile gown, sterile gloves, and large sterile sheet  Hand hygiene: hand hygiene performed prior to central venous catheter insertion  Ultrasound guidance: yes  sterile gel and probe cover used in ultrasound-guided central venous catheter insertionPre-procedure: landmarks identified  Vessel of Catheter Tip End: SVC  Number of attempts: 1  Successful placement: yes  Post-procedure: chlorhexidine patch applied,  line sutured and dressing applied  independent trained observer present for patient monitoring -

## 2021-03-31 NOTE — ANESTHESIA POSTPROCEDURE EVALUATION
Post-Op Assessment Note    CV Status:  Stable  Pain Score: 0    Pain management: adequate     Mental Status:  Unresponsive   Hydration Status:  Euvolemic and stable   PONV Controlled:  Controlled   Airway Patency:  Patent  Airway: intubated      Post Op Vitals Reviewed: Yes      Staff: Anesthesiologist         No complications documented      BP   128/72   Temp      Pulse  78   Resp   vented   SpO2   99

## 2021-04-01 ENCOUNTER — APPOINTMENT (INPATIENT)
Dept: RADIOLOGY | Facility: HOSPITAL | Age: 76
DRG: 236 | End: 2021-04-01
Payer: COMMERCIAL

## 2021-04-01 PROBLEM — D69.6 THROMBOCYTOPENIA (HCC): Status: ACTIVE | Noted: 2021-04-01

## 2021-04-01 PROBLEM — D62 POSTOPERATIVE ANEMIA DUE TO ACUTE BLOOD LOSS: Status: ACTIVE | Noted: 2021-04-01

## 2021-04-01 LAB
ABO GROUP BLD BPU: NORMAL
ANION GAP SERPL CALCULATED.3IONS-SCNC: 5 MMOL/L (ref 4–13)
ATRIAL RATE: 80 BPM
ATRIAL RATE: 97 BPM
BPU ID: NORMAL
BUN SERPL-MCNC: 20 MG/DL (ref 5–25)
CALCIUM SERPL-MCNC: 8 MG/DL (ref 8.3–10.1)
CHLORIDE SERPL-SCNC: 115 MMOL/L (ref 100–108)
CO2 SERPL-SCNC: 23 MMOL/L (ref 21–32)
CREAT SERPL-MCNC: 1.03 MG/DL (ref 0.6–1.3)
CROSSMATCH: NORMAL
ERYTHROCYTE [DISTWIDTH] IN BLOOD BY AUTOMATED COUNT: 16.2 % (ref 11.6–15.1)
GFR SERPL CREATININE-BSD FRML MDRD: 54 ML/MIN/1.73SQ M
GLUCOSE SERPL-MCNC: 101 MG/DL (ref 65–140)
GLUCOSE SERPL-MCNC: 120 MG/DL (ref 65–140)
GLUCOSE SERPL-MCNC: 123 MG/DL (ref 65–140)
GLUCOSE SERPL-MCNC: 123 MG/DL (ref 65–140)
GLUCOSE SERPL-MCNC: 124 MG/DL (ref 65–140)
GLUCOSE SERPL-MCNC: 131 MG/DL (ref 65–140)
GLUCOSE SERPL-MCNC: 133 MG/DL (ref 65–140)
GLUCOSE SERPL-MCNC: 136 MG/DL (ref 65–140)
GLUCOSE SERPL-MCNC: 138 MG/DL (ref 65–140)
GLUCOSE SERPL-MCNC: 143 MG/DL (ref 65–140)
GLUCOSE SERPL-MCNC: 147 MG/DL (ref 65–140)
GLUCOSE SERPL-MCNC: 150 MG/DL (ref 65–140)
GLUCOSE SERPL-MCNC: 157 MG/DL (ref 65–140)
GLUCOSE SERPL-MCNC: 162 MG/DL (ref 65–140)
HCT VFR BLD AUTO: 34.6 % (ref 34.8–46.1)
HCT VFR BLD AUTO: 35.3 % (ref 34.8–46.1)
HGB BLD-MCNC: 10.9 G/DL (ref 11.5–15.4)
HGB BLD-MCNC: 11.5 G/DL (ref 11.5–15.4)
MAGNESIUM SERPL-MCNC: 2.8 MG/DL (ref 1.6–2.6)
MCH RBC QN AUTO: 28.5 PG (ref 26.8–34.3)
MCHC RBC AUTO-ENTMCNC: 31.5 G/DL (ref 31.4–37.4)
MCV RBC AUTO: 90 FL (ref 82–98)
P AXIS: 62 DEGREES
P AXIS: 65 DEGREES
PLATELET # BLD AUTO: 131 THOUSANDS/UL (ref 149–390)
PMV BLD AUTO: 10.9 FL (ref 8.9–12.7)
POTASSIUM SERPL-SCNC: 4.4 MMOL/L (ref 3.5–5.3)
POTASSIUM SERPL-SCNC: 4.7 MMOL/L (ref 3.5–5.3)
PR INTERVAL: 154 MS
PR INTERVAL: 192 MS
QRS AXIS: -36 DEGREES
QRS AXIS: -37 DEGREES
QRSD INTERVAL: 83 MS
QRSD INTERVAL: 88 MS
QT INTERVAL: 383 MS
QT INTERVAL: 446 MS
QTC INTERVAL: 487 MS
QTC INTERVAL: 515 MS
RBC # BLD AUTO: 3.83 MILLION/UL (ref 3.81–5.12)
SODIUM SERPL-SCNC: 143 MMOL/L (ref 136–145)
T WAVE AXIS: 44 DEGREES
T WAVE AXIS: 47 DEGREES
UNIT DISPENSE STATUS: NORMAL
UNIT PRODUCT CODE: NORMAL
UNIT RH: NORMAL
VENTRICULAR RATE: 80 BPM
VENTRICULAR RATE: 97 BPM
WBC # BLD AUTO: 10.02 THOUSAND/UL (ref 4.31–10.16)

## 2021-04-01 PROCEDURE — 97167 OT EVAL HIGH COMPLEX 60 MIN: CPT

## 2021-04-01 PROCEDURE — 97163 PT EVAL HIGH COMPLEX 45 MIN: CPT

## 2021-04-01 PROCEDURE — 83735 ASSAY OF MAGNESIUM: CPT | Performed by: PHYSICIAN ASSISTANT

## 2021-04-01 PROCEDURE — 85014 HEMATOCRIT: CPT | Performed by: PHYSICIAN ASSISTANT

## 2021-04-01 PROCEDURE — 94760 N-INVAS EAR/PLS OXIMETRY 1: CPT

## 2021-04-01 PROCEDURE — 99024 POSTOP FOLLOW-UP VISIT: CPT | Performed by: THORACIC SURGERY (CARDIOTHORACIC VASCULAR SURGERY)

## 2021-04-01 PROCEDURE — 93010 ELECTROCARDIOGRAM REPORT: CPT | Performed by: INTERNAL MEDICINE

## 2021-04-01 PROCEDURE — 84132 ASSAY OF SERUM POTASSIUM: CPT | Performed by: PHYSICIAN ASSISTANT

## 2021-04-01 PROCEDURE — 85018 HEMOGLOBIN: CPT | Performed by: PHYSICIAN ASSISTANT

## 2021-04-01 PROCEDURE — 80048 BASIC METABOLIC PNL TOTAL CA: CPT | Performed by: PHYSICIAN ASSISTANT

## 2021-04-01 PROCEDURE — 99222 1ST HOSP IP/OBS MODERATE 55: CPT | Performed by: INTERNAL MEDICINE

## 2021-04-01 PROCEDURE — 85027 COMPLETE CBC AUTOMATED: CPT | Performed by: PHYSICIAN ASSISTANT

## 2021-04-01 PROCEDURE — 71045 X-RAY EXAM CHEST 1 VIEW: CPT

## 2021-04-01 PROCEDURE — 93005 ELECTROCARDIOGRAM TRACING: CPT

## 2021-04-01 PROCEDURE — 94760 N-INVAS EAR/PLS OXIMETRY 1: CPT | Performed by: SOCIAL WORKER

## 2021-04-01 PROCEDURE — 94002 VENT MGMT INPAT INIT DAY: CPT

## 2021-04-01 PROCEDURE — 82948 REAGENT STRIP/BLOOD GLUCOSE: CPT

## 2021-04-01 RX ORDER — ACETAMINOPHEN 325 MG/1
975 TABLET ORAL EVERY 8 HOURS
Status: DISCONTINUED | OUTPATIENT
Start: 2021-04-01 | End: 2021-04-01 | Stop reason: SDUPTHER

## 2021-04-01 RX ORDER — ONDANSETRON 2 MG/ML
4 INJECTION INTRAMUSCULAR; INTRAVENOUS EVERY 6 HOURS PRN
Status: DISCONTINUED | OUTPATIENT
Start: 2021-04-01 | End: 2021-04-01 | Stop reason: SDUPTHER

## 2021-04-01 RX ORDER — FUROSEMIDE 10 MG/ML
40 INJECTION INTRAMUSCULAR; INTRAVENOUS
Status: DISCONTINUED | OUTPATIENT
Start: 2021-04-01 | End: 2021-04-01

## 2021-04-01 RX ORDER — ALBUMIN, HUMAN INJ 5% 5 %
12.5 SOLUTION INTRAVENOUS ONCE
Status: COMPLETED | OUTPATIENT
Start: 2021-04-01 | End: 2021-04-01

## 2021-04-01 RX ORDER — AMLODIPINE BESYLATE 5 MG/1
5 TABLET ORAL DAILY
Status: DISCONTINUED | OUTPATIENT
Start: 2021-04-01 | End: 2021-04-03

## 2021-04-01 RX ORDER — FUROSEMIDE 10 MG/ML
40 INJECTION INTRAMUSCULAR; INTRAVENOUS
Status: DISCONTINUED | OUTPATIENT
Start: 2021-04-01 | End: 2021-04-02

## 2021-04-01 RX ORDER — OXYCODONE HYDROCHLORIDE 5 MG/1
5 TABLET ORAL EVERY 4 HOURS PRN
Status: DISCONTINUED | OUTPATIENT
Start: 2021-04-01 | End: 2021-04-01 | Stop reason: SDUPTHER

## 2021-04-01 RX ORDER — DOCUSATE SODIUM 100 MG/1
100 CAPSULE, LIQUID FILLED ORAL 2 TIMES DAILY
Status: DISCONTINUED | OUTPATIENT
Start: 2021-04-01 | End: 2021-04-05 | Stop reason: HOSPADM

## 2021-04-01 RX ORDER — OXYCODONE HYDROCHLORIDE 5 MG/1
2.5 TABLET ORAL EVERY 4 HOURS PRN
Status: DISCONTINUED | OUTPATIENT
Start: 2021-04-01 | End: 2021-04-01 | Stop reason: SDUPTHER

## 2021-04-01 RX ORDER — PANTOPRAZOLE SODIUM 40 MG/1
40 TABLET, DELAYED RELEASE ORAL
Status: DISCONTINUED | OUTPATIENT
Start: 2021-04-02 | End: 2021-04-04

## 2021-04-01 RX ORDER — HEPARIN SODIUM 5000 [USP'U]/ML
5000 INJECTION, SOLUTION INTRAVENOUS; SUBCUTANEOUS EVERY 8 HOURS SCHEDULED
Status: DISCONTINUED | OUTPATIENT
Start: 2021-04-02 | End: 2021-04-01

## 2021-04-01 RX ORDER — TEMAZEPAM 15 MG/1
15 CAPSULE ORAL
Status: DISCONTINUED | OUTPATIENT
Start: 2021-04-01 | End: 2021-04-05 | Stop reason: HOSPADM

## 2021-04-01 RX ORDER — DOCUSATE SODIUM 100 MG/1
100 CAPSULE, LIQUID FILLED ORAL 2 TIMES DAILY
Status: DISCONTINUED | OUTPATIENT
Start: 2021-04-01 | End: 2021-04-01 | Stop reason: SDUPTHER

## 2021-04-01 RX ORDER — POTASSIUM CHLORIDE 20 MEQ/1
20 TABLET, EXTENDED RELEASE ORAL 2 TIMES DAILY
Status: DISCONTINUED | OUTPATIENT
Start: 2021-04-01 | End: 2021-04-02

## 2021-04-01 RX ADMIN — CEFAZOLIN SODIUM 1000 MG: 1 SOLUTION INTRAVENOUS at 06:12

## 2021-04-01 RX ADMIN — ONDANSETRON 4 MG: 2 INJECTION INTRAMUSCULAR; INTRAVENOUS at 10:40

## 2021-04-01 RX ADMIN — POLYETHYLENE GLYCOL 3350 17 G: 17 POWDER, FOR SOLUTION ORAL at 08:00

## 2021-04-01 RX ADMIN — AMLODIPINE BESYLATE 5 MG: 5 TABLET ORAL at 10:30

## 2021-04-01 RX ADMIN — ACETAMINOPHEN 975 MG: 325 TABLET, FILM COATED ORAL at 17:08

## 2021-04-01 RX ADMIN — OXYCODONE HYDROCHLORIDE 5 MG: 5 TABLET ORAL at 00:49

## 2021-04-01 RX ADMIN — FUROSEMIDE 40 MG: 10 INJECTION, SOLUTION INTRAMUSCULAR; INTRAVENOUS at 10:30

## 2021-04-01 RX ADMIN — METOPROLOL TARTRATE 25 MG: 25 TABLET, FILM COATED ORAL at 20:19

## 2021-04-01 RX ADMIN — NICARDIPINE HYDROCHLORIDE 5 MG/HR: 2.5 INJECTION, SOLUTION INTRAVENOUS at 12:19

## 2021-04-01 RX ADMIN — AMIODARONE HYDROCHLORIDE 200 MG: 200 TABLET ORAL at 14:09

## 2021-04-01 RX ADMIN — POTASSIUM CHLORIDE 20 MEQ: 1500 TABLET, EXTENDED RELEASE ORAL at 18:11

## 2021-04-01 RX ADMIN — MUPIROCIN 1 APPLICATION: 20 OINTMENT TOPICAL at 20:19

## 2021-04-01 RX ADMIN — NICARDIPINE HYDROCHLORIDE 7.5 MG/HR: 2.5 INJECTION, SOLUTION INTRAVENOUS at 04:53

## 2021-04-01 RX ADMIN — NICARDIPINE HYDROCHLORIDE 15 MG/HR: 2.5 INJECTION, SOLUTION INTRAVENOUS at 09:40

## 2021-04-01 RX ADMIN — METOPROLOL TARTRATE 25 MG: 25 TABLET, FILM COATED ORAL at 08:49

## 2021-04-01 RX ADMIN — OXYCODONE HYDROCHLORIDE 5 MG: 5 TABLET ORAL at 06:11

## 2021-04-01 RX ADMIN — FUROSEMIDE 40 MG: 10 INJECTION, SOLUTION INTRAMUSCULAR; INTRAVENOUS at 16:30

## 2021-04-01 RX ADMIN — DOCUSATE SODIUM 100 MG: 100 CAPSULE, LIQUID FILLED ORAL at 08:49

## 2021-04-01 RX ADMIN — AMIODARONE HYDROCHLORIDE 200 MG: 200 TABLET ORAL at 22:45

## 2021-04-01 RX ADMIN — CHLORHEXIDINE GLUCONATE 0.12% ORAL RINSE 15 ML: 1.2 LIQUID ORAL at 08:00

## 2021-04-01 RX ADMIN — OXYCODONE HYDROCHLORIDE 5 MG: 5 TABLET ORAL at 10:30

## 2021-04-01 RX ADMIN — DOCUSATE SODIUM 100 MG: 100 CAPSULE, LIQUID FILLED ORAL at 18:11

## 2021-04-01 RX ADMIN — CEFAZOLIN SODIUM 1000 MG: 1 SOLUTION INTRAVENOUS at 14:05

## 2021-04-01 RX ADMIN — FONDAPARINUX SODIUM 2.5 MG: 2.5 INJECTION, SOLUTION SUBCUTANEOUS at 08:00

## 2021-04-01 RX ADMIN — HYDROMORPHONE HYDROCHLORIDE 0.5 MG: 1 INJECTION, SOLUTION INTRAMUSCULAR; INTRAVENOUS; SUBCUTANEOUS at 02:11

## 2021-04-01 RX ADMIN — MUPIROCIN 1 APPLICATION: 20 OINTMENT TOPICAL at 08:01

## 2021-04-01 RX ADMIN — SILVER NITRATE APPLICATORS: 25; 75 STICK TOPICAL at 12:55

## 2021-04-01 RX ADMIN — MILRINONE LACTATE IN DEXTROSE 0.25 MCG/KG/MIN: 200 INJECTION, SOLUTION INTRAVENOUS at 00:56

## 2021-04-01 RX ADMIN — HYDROMORPHONE HYDROCHLORIDE 0.5 MG: 1 INJECTION, SOLUTION INTRAMUSCULAR; INTRAVENOUS; SUBCUTANEOUS at 06:28

## 2021-04-01 RX ADMIN — ALBUMIN (HUMAN) 12.5 G: 12.5 INJECTION, SOLUTION INTRAVENOUS at 00:49

## 2021-04-01 RX ADMIN — POTASSIUM CHLORIDE 20 MEQ: 1500 TABLET, EXTENDED RELEASE ORAL at 08:49

## 2021-04-01 RX ADMIN — ASPIRIN 325 MG ORAL TABLET 325 MG: 325 PILL ORAL at 08:00

## 2021-04-01 RX ADMIN — ATORVASTATIN CALCIUM 80 MG: 80 TABLET, FILM COATED ORAL at 16:20

## 2021-04-01 RX ADMIN — NICARDIPINE HYDROCHLORIDE 12 MG/HR: 2.5 INJECTION, SOLUTION INTRAVENOUS at 07:36

## 2021-04-02 PROBLEM — E87.8 HYPERCHLOREMIA: Status: ACTIVE | Noted: 2021-04-02

## 2021-04-02 PROBLEM — D64.9 ANEMIA: Status: ACTIVE | Noted: 2021-04-02

## 2021-04-02 LAB
25(OH)D3 SERPL-MCNC: 51.6 NG/ML (ref 30–100)
ANION GAP SERPL CALCULATED.3IONS-SCNC: 3 MMOL/L (ref 4–13)
BUN SERPL-MCNC: 17 MG/DL (ref 5–25)
CALCIUM SERPL-MCNC: 8.4 MG/DL (ref 8.3–10.1)
CHLORIDE SERPL-SCNC: 113 MMOL/L (ref 100–108)
CO2 SERPL-SCNC: 26 MMOL/L (ref 21–32)
CREAT SERPL-MCNC: 0.86 MG/DL (ref 0.6–1.3)
ERYTHROCYTE [DISTWIDTH] IN BLOOD BY AUTOMATED COUNT: 16.5 % (ref 11.6–15.1)
GFR SERPL CREATININE-BSD FRML MDRD: 67 ML/MIN/1.73SQ M
GLUCOSE SERPL-MCNC: 108 MG/DL (ref 65–140)
GLUCOSE SERPL-MCNC: 126 MG/DL (ref 65–140)
GLUCOSE SERPL-MCNC: 129 MG/DL (ref 65–140)
GLUCOSE SERPL-MCNC: 129 MG/DL (ref 65–140)
GLUCOSE SERPL-MCNC: 132 MG/DL (ref 65–140)
GLUCOSE SERPL-MCNC: 133 MG/DL (ref 65–140)
GLUCOSE SERPL-MCNC: 143 MG/DL (ref 65–140)
GLUCOSE SERPL-MCNC: 175 MG/DL (ref 65–140)
HCT VFR BLD AUTO: 33.9 % (ref 34.8–46.1)
HGB BLD-MCNC: 10.4 G/DL (ref 11.5–15.4)
MCH RBC QN AUTO: 28.7 PG (ref 26.8–34.3)
MCHC RBC AUTO-ENTMCNC: 30.7 G/DL (ref 31.4–37.4)
MCV RBC AUTO: 93 FL (ref 82–98)
PLATELET # BLD AUTO: 128 THOUSANDS/UL (ref 149–390)
PMV BLD AUTO: 10.9 FL (ref 8.9–12.7)
POTASSIUM SERPL-SCNC: 4.9 MMOL/L (ref 3.5–5.3)
RBC # BLD AUTO: 3.63 MILLION/UL (ref 3.81–5.12)
SODIUM SERPL-SCNC: 142 MMOL/L (ref 136–145)
WBC # BLD AUTO: 8.96 THOUSAND/UL (ref 4.31–10.16)

## 2021-04-02 PROCEDURE — 99232 SBSQ HOSP IP/OBS MODERATE 35: CPT | Performed by: INTERNAL MEDICINE

## 2021-04-02 PROCEDURE — 94760 N-INVAS EAR/PLS OXIMETRY 1: CPT

## 2021-04-02 PROCEDURE — 82306 VITAMIN D 25 HYDROXY: CPT | Performed by: NURSE PRACTITIONER

## 2021-04-02 PROCEDURE — 99024 POSTOP FOLLOW-UP VISIT: CPT | Performed by: PHYSICIAN ASSISTANT

## 2021-04-02 PROCEDURE — 85027 COMPLETE CBC AUTOMATED: CPT | Performed by: NURSE PRACTITIONER

## 2021-04-02 PROCEDURE — 82948 REAGENT STRIP/BLOOD GLUCOSE: CPT

## 2021-04-02 PROCEDURE — NC001 PR NO CHARGE: Performed by: PHYSICIAN ASSISTANT

## 2021-04-02 PROCEDURE — 97530 THERAPEUTIC ACTIVITIES: CPT

## 2021-04-02 PROCEDURE — 80048 BASIC METABOLIC PNL TOTAL CA: CPT | Performed by: NURSE PRACTITIONER

## 2021-04-02 PROCEDURE — 97535 SELF CARE MNGMENT TRAINING: CPT

## 2021-04-02 PROCEDURE — 97116 GAIT TRAINING THERAPY: CPT

## 2021-04-02 RX ORDER — FUROSEMIDE 10 MG/ML
40 INJECTION INTRAMUSCULAR; INTRAVENOUS
Status: DISCONTINUED | OUTPATIENT
Start: 2021-04-02 | End: 2021-04-05 | Stop reason: HOSPADM

## 2021-04-02 RX ORDER — POTASSIUM CHLORIDE 20 MEQ/1
20 TABLET, EXTENDED RELEASE ORAL
Status: DISCONTINUED | OUTPATIENT
Start: 2021-04-02 | End: 2021-04-05 | Stop reason: HOSPADM

## 2021-04-02 RX ORDER — METOPROLOL TARTRATE 50 MG/1
50 TABLET, FILM COATED ORAL EVERY 12 HOURS SCHEDULED
Status: DISCONTINUED | OUTPATIENT
Start: 2021-04-02 | End: 2021-04-03

## 2021-04-02 RX ADMIN — AMIODARONE HYDROCHLORIDE 200 MG: 200 TABLET ORAL at 17:58

## 2021-04-02 RX ADMIN — MUPIROCIN 1 APPLICATION: 20 OINTMENT TOPICAL at 08:44

## 2021-04-02 RX ADMIN — FUROSEMIDE 40 MG: 10 INJECTION, SOLUTION INTRAVENOUS at 17:58

## 2021-04-02 RX ADMIN — ACETAMINOPHEN 975 MG: 325 TABLET, FILM COATED ORAL at 01:44

## 2021-04-02 RX ADMIN — ACETAMINOPHEN 975 MG: 325 TABLET, FILM COATED ORAL at 08:43

## 2021-04-02 RX ADMIN — AMLODIPINE BESYLATE 5 MG: 5 TABLET ORAL at 08:44

## 2021-04-02 RX ADMIN — POTASSIUM CHLORIDE 20 MEQ: 1500 TABLET, EXTENDED RELEASE ORAL at 17:58

## 2021-04-02 RX ADMIN — FUROSEMIDE 40 MG: 10 INJECTION, SOLUTION INTRAVENOUS at 12:48

## 2021-04-02 RX ADMIN — FONDAPARINUX SODIUM 2.5 MG: 2.5 INJECTION, SOLUTION SUBCUTANEOUS at 08:43

## 2021-04-02 RX ADMIN — ASPIRIN 325 MG ORAL TABLET 325 MG: 325 PILL ORAL at 08:44

## 2021-04-02 RX ADMIN — AMIODARONE HYDROCHLORIDE 200 MG: 200 TABLET ORAL at 05:53

## 2021-04-02 RX ADMIN — INSULIN LISPRO 1 UNITS: 100 INJECTION, SOLUTION INTRAVENOUS; SUBCUTANEOUS at 21:32

## 2021-04-02 RX ADMIN — METOPROLOL TARTRATE 50 MG: 50 TABLET, FILM COATED ORAL at 21:31

## 2021-04-02 RX ADMIN — DOCUSATE SODIUM 100 MG: 100 CAPSULE, LIQUID FILLED ORAL at 08:44

## 2021-04-02 RX ADMIN — OXYCODONE HYDROCHLORIDE 5 MG: 5 TABLET ORAL at 01:44

## 2021-04-02 RX ADMIN — POLYETHYLENE GLYCOL 3350 17 G: 17 POWDER, FOR SOLUTION ORAL at 08:44

## 2021-04-02 RX ADMIN — DOCUSATE SODIUM 100 MG: 100 CAPSULE, LIQUID FILLED ORAL at 17:58

## 2021-04-02 RX ADMIN — ATORVASTATIN CALCIUM 80 MG: 80 TABLET, FILM COATED ORAL at 17:57

## 2021-04-02 RX ADMIN — MUPIROCIN 1 APPLICATION: 20 OINTMENT TOPICAL at 21:31

## 2021-04-02 RX ADMIN — POTASSIUM CHLORIDE 20 MEQ: 1500 TABLET, EXTENDED RELEASE ORAL at 12:48

## 2021-04-02 RX ADMIN — ACETAMINOPHEN 975 MG: 325 TABLET, FILM COATED ORAL at 17:57

## 2021-04-02 RX ADMIN — METOPROLOL TARTRATE 50 MG: 50 TABLET, FILM COATED ORAL at 08:44

## 2021-04-02 RX ADMIN — PANTOPRAZOLE SODIUM 40 MG: 40 TABLET, DELAYED RELEASE ORAL at 05:53

## 2021-04-03 LAB
ANION GAP SERPL CALCULATED.3IONS-SCNC: 5 MMOL/L (ref 4–13)
BUN SERPL-MCNC: 16 MG/DL (ref 5–25)
CALCIUM SERPL-MCNC: 8.8 MG/DL (ref 8.3–10.1)
CHLORIDE SERPL-SCNC: 107 MMOL/L (ref 100–108)
CO2 SERPL-SCNC: 28 MMOL/L (ref 21–32)
CREAT SERPL-MCNC: 0.64 MG/DL (ref 0.6–1.3)
ERYTHROCYTE [DISTWIDTH] IN BLOOD BY AUTOMATED COUNT: 16 % (ref 11.6–15.1)
GFR SERPL CREATININE-BSD FRML MDRD: 88 ML/MIN/1.73SQ M
GLUCOSE SERPL-MCNC: 116 MG/DL (ref 65–140)
GLUCOSE SERPL-MCNC: 124 MG/DL (ref 65–140)
GLUCOSE SERPL-MCNC: 140 MG/DL (ref 65–140)
GLUCOSE SERPL-MCNC: 154 MG/DL (ref 65–140)
GLUCOSE SERPL-MCNC: 155 MG/DL (ref 65–140)
GLUCOSE SERPL-MCNC: 176 MG/DL (ref 65–140)
GLUCOSE SERPL-MCNC: 177 MG/DL (ref 65–140)
HCT VFR BLD AUTO: 32.5 % (ref 34.8–46.1)
HGB BLD-MCNC: 9.9 G/DL (ref 11.5–15.4)
MCH RBC QN AUTO: 28.2 PG (ref 26.8–34.3)
MCHC RBC AUTO-ENTMCNC: 30.5 G/DL (ref 31.4–37.4)
MCV RBC AUTO: 93 FL (ref 82–98)
PLATELET # BLD AUTO: 146 THOUSANDS/UL (ref 149–390)
PMV BLD AUTO: 10.8 FL (ref 8.9–12.7)
POTASSIUM SERPL-SCNC: 4.4 MMOL/L (ref 3.5–5.3)
RBC # BLD AUTO: 3.51 MILLION/UL (ref 3.81–5.12)
SODIUM SERPL-SCNC: 140 MMOL/L (ref 136–145)
WBC # BLD AUTO: 7.72 THOUSAND/UL (ref 4.31–10.16)

## 2021-04-03 PROCEDURE — 80048 BASIC METABOLIC PNL TOTAL CA: CPT | Performed by: PHYSICIAN ASSISTANT

## 2021-04-03 PROCEDURE — 85027 COMPLETE CBC AUTOMATED: CPT | Performed by: PHYSICIAN ASSISTANT

## 2021-04-03 PROCEDURE — 94664 DEMO&/EVAL PT USE INHALER: CPT

## 2021-04-03 PROCEDURE — 82948 REAGENT STRIP/BLOOD GLUCOSE: CPT

## 2021-04-03 PROCEDURE — 99024 POSTOP FOLLOW-UP VISIT: CPT | Performed by: THORACIC SURGERY (CARDIOTHORACIC VASCULAR SURGERY)

## 2021-04-03 RX ORDER — MUSCLE RUB CREAM 100; 150 MG/G; MG/G
CREAM TOPICAL 4 TIMES DAILY PRN
Status: DISCONTINUED | OUTPATIENT
Start: 2021-04-03 | End: 2021-04-05 | Stop reason: HOSPADM

## 2021-04-03 RX ORDER — LIDOCAINE 50 MG/G
2 PATCH TOPICAL DAILY
Status: DISCONTINUED | OUTPATIENT
Start: 2021-04-03 | End: 2021-04-05 | Stop reason: HOSPADM

## 2021-04-03 RX ORDER — AMLODIPINE BESYLATE 10 MG/1
10 TABLET ORAL DAILY
Status: DISCONTINUED | OUTPATIENT
Start: 2021-04-03 | End: 2021-04-04

## 2021-04-03 RX ADMIN — AMLODIPINE BESYLATE 10 MG: 10 TABLET ORAL at 08:05

## 2021-04-03 RX ADMIN — AMIODARONE HYDROCHLORIDE 200 MG: 200 TABLET ORAL at 05:24

## 2021-04-03 RX ADMIN — MUPIROCIN 1 APPLICATION: 20 OINTMENT TOPICAL at 22:00

## 2021-04-03 RX ADMIN — FUROSEMIDE 40 MG: 10 INJECTION, SOLUTION INTRAVENOUS at 17:08

## 2021-04-03 RX ADMIN — AMIODARONE HYDROCHLORIDE 200 MG: 200 TABLET ORAL at 14:10

## 2021-04-03 RX ADMIN — ACETAMINOPHEN 975 MG: 325 TABLET, FILM COATED ORAL at 11:34

## 2021-04-03 RX ADMIN — POTASSIUM CHLORIDE 20 MEQ: 1500 TABLET, EXTENDED RELEASE ORAL at 11:34

## 2021-04-03 RX ADMIN — POTASSIUM CHLORIDE 20 MEQ: 1500 TABLET, EXTENDED RELEASE ORAL at 08:05

## 2021-04-03 RX ADMIN — DOCUSATE SODIUM 100 MG: 100 CAPSULE, LIQUID FILLED ORAL at 08:05

## 2021-04-03 RX ADMIN — METOPROLOL TARTRATE 75 MG: 50 TABLET, FILM COATED ORAL at 22:00

## 2021-04-03 RX ADMIN — FONDAPARINUX SODIUM 2.5 MG: 2.5 INJECTION, SOLUTION SUBCUTANEOUS at 08:05

## 2021-04-03 RX ADMIN — INSULIN LISPRO 1 UNITS: 100 INJECTION, SOLUTION INTRAVENOUS; SUBCUTANEOUS at 17:09

## 2021-04-03 RX ADMIN — FUROSEMIDE 40 MG: 10 INJECTION, SOLUTION INTRAVENOUS at 11:34

## 2021-04-03 RX ADMIN — ATORVASTATIN CALCIUM 80 MG: 80 TABLET, FILM COATED ORAL at 17:08

## 2021-04-03 RX ADMIN — MUPIROCIN 1 APPLICATION: 20 OINTMENT TOPICAL at 08:05

## 2021-04-03 RX ADMIN — ACETAMINOPHEN 975 MG: 325 TABLET, FILM COATED ORAL at 04:10

## 2021-04-03 RX ADMIN — FUROSEMIDE 40 MG: 10 INJECTION, SOLUTION INTRAVENOUS at 05:23

## 2021-04-03 RX ADMIN — POTASSIUM CHLORIDE 20 MEQ: 1500 TABLET, EXTENDED RELEASE ORAL at 17:08

## 2021-04-03 RX ADMIN — DOCUSATE SODIUM 100 MG: 100 CAPSULE, LIQUID FILLED ORAL at 17:08

## 2021-04-03 RX ADMIN — PANTOPRAZOLE SODIUM 40 MG: 40 TABLET, DELAYED RELEASE ORAL at 05:24

## 2021-04-03 RX ADMIN — ASPIRIN 325 MG ORAL TABLET 325 MG: 325 PILL ORAL at 08:05

## 2021-04-03 RX ADMIN — POLYETHYLENE GLYCOL 3350 17 G: 17 POWDER, FOR SOLUTION ORAL at 08:05

## 2021-04-03 RX ADMIN — AMIODARONE HYDROCHLORIDE 200 MG: 200 TABLET ORAL at 22:14

## 2021-04-03 RX ADMIN — LIDOCAINE 2 PATCH: 50 PATCH TOPICAL at 08:08

## 2021-04-03 RX ADMIN — INSULIN LISPRO 1 UNITS: 100 INJECTION, SOLUTION INTRAVENOUS; SUBCUTANEOUS at 11:35

## 2021-04-03 RX ADMIN — METOPROLOL TARTRATE 75 MG: 50 TABLET, FILM COATED ORAL at 08:04

## 2021-04-04 LAB
ANION GAP SERPL CALCULATED.3IONS-SCNC: 5 MMOL/L (ref 4–13)
ATRIAL RATE: 73 BPM
BUN SERPL-MCNC: 18 MG/DL (ref 5–25)
CALCIUM SERPL-MCNC: 9.3 MG/DL (ref 8.3–10.1)
CHLORIDE SERPL-SCNC: 105 MMOL/L (ref 100–108)
CO2 SERPL-SCNC: 29 MMOL/L (ref 21–32)
CREAT SERPL-MCNC: 0.9 MG/DL (ref 0.6–1.3)
ERYTHROCYTE [DISTWIDTH] IN BLOOD BY AUTOMATED COUNT: 15.6 % (ref 11.6–15.1)
GFR SERPL CREATININE-BSD FRML MDRD: 63 ML/MIN/1.73SQ M
GLUCOSE SERPL-MCNC: 101 MG/DL (ref 65–140)
GLUCOSE SERPL-MCNC: 122 MG/DL (ref 65–140)
GLUCOSE SERPL-MCNC: 122 MG/DL (ref 65–140)
GLUCOSE SERPL-MCNC: 137 MG/DL (ref 65–140)
GLUCOSE SERPL-MCNC: 152 MG/DL (ref 65–140)
HCT VFR BLD AUTO: 37.8 % (ref 34.8–46.1)
HGB BLD-MCNC: 11.8 G/DL (ref 11.5–15.4)
MCH RBC QN AUTO: 28.6 PG (ref 26.8–34.3)
MCHC RBC AUTO-ENTMCNC: 31.2 G/DL (ref 31.4–37.4)
MCV RBC AUTO: 92 FL (ref 82–98)
P AXIS: 24 DEGREES
PLATELET # BLD AUTO: 199 THOUSANDS/UL (ref 149–390)
PMV BLD AUTO: 10.6 FL (ref 8.9–12.7)
POTASSIUM SERPL-SCNC: 4.6 MMOL/L (ref 3.5–5.3)
PR INTERVAL: 146 MS
QRS AXIS: -41 DEGREES
QRSD INTERVAL: 88 MS
QT INTERVAL: 436 MS
QTC INTERVAL: 480 MS
RBC # BLD AUTO: 4.12 MILLION/UL (ref 3.81–5.12)
SODIUM SERPL-SCNC: 139 MMOL/L (ref 136–145)
T WAVE AXIS: -23 DEGREES
VENTRICULAR RATE: 73 BPM
WBC # BLD AUTO: 8.74 THOUSAND/UL (ref 4.31–10.16)

## 2021-04-04 PROCEDURE — 85027 COMPLETE CBC AUTOMATED: CPT | Performed by: PHYSICIAN ASSISTANT

## 2021-04-04 PROCEDURE — 93005 ELECTROCARDIOGRAM TRACING: CPT

## 2021-04-04 PROCEDURE — 80048 BASIC METABOLIC PNL TOTAL CA: CPT | Performed by: PHYSICIAN ASSISTANT

## 2021-04-04 PROCEDURE — 99024 POSTOP FOLLOW-UP VISIT: CPT | Performed by: THORACIC SURGERY (CARDIOTHORACIC VASCULAR SURGERY)

## 2021-04-04 PROCEDURE — 93010 ELECTROCARDIOGRAM REPORT: CPT | Performed by: INTERNAL MEDICINE

## 2021-04-04 PROCEDURE — 82948 REAGENT STRIP/BLOOD GLUCOSE: CPT

## 2021-04-04 RX ORDER — LISINOPRIL 10 MG/1
10 TABLET ORAL DAILY
Status: DISCONTINUED | OUTPATIENT
Start: 2021-04-04 | End: 2021-04-05

## 2021-04-04 RX ORDER — METOPROLOL TARTRATE 50 MG/1
100 TABLET, FILM COATED ORAL EVERY 12 HOURS SCHEDULED
Status: DISCONTINUED | OUTPATIENT
Start: 2021-04-04 | End: 2021-04-05 | Stop reason: HOSPADM

## 2021-04-04 RX ADMIN — POLYETHYLENE GLYCOL 3350 17 G: 17 POWDER, FOR SOLUTION ORAL at 08:51

## 2021-04-04 RX ADMIN — AMIODARONE HYDROCHLORIDE 200 MG: 200 TABLET ORAL at 05:02

## 2021-04-04 RX ADMIN — FUROSEMIDE 40 MG: 10 INJECTION, SOLUTION INTRAVENOUS at 12:25

## 2021-04-04 RX ADMIN — FONDAPARINUX SODIUM 2.5 MG: 2.5 INJECTION, SOLUTION SUBCUTANEOUS at 08:51

## 2021-04-04 RX ADMIN — ACETAMINOPHEN 975 MG: 325 TABLET, FILM COATED ORAL at 02:00

## 2021-04-04 RX ADMIN — METOPROLOL TARTRATE 100 MG: 50 TABLET, FILM COATED ORAL at 21:07

## 2021-04-04 RX ADMIN — LISINOPRIL 10 MG: 10 TABLET ORAL at 08:51

## 2021-04-04 RX ADMIN — ASPIRIN 325 MG ORAL TABLET 325 MG: 325 PILL ORAL at 08:51

## 2021-04-04 RX ADMIN — AMIODARONE HYDROCHLORIDE 200 MG: 200 TABLET ORAL at 15:11

## 2021-04-04 RX ADMIN — INSULIN LISPRO 1 UNITS: 100 INJECTION, SOLUTION INTRAVENOUS; SUBCUTANEOUS at 12:30

## 2021-04-04 RX ADMIN — POTASSIUM CHLORIDE 20 MEQ: 1500 TABLET, EXTENDED RELEASE ORAL at 12:25

## 2021-04-04 RX ADMIN — AMIODARONE HYDROCHLORIDE 200 MG: 200 TABLET ORAL at 21:07

## 2021-04-04 RX ADMIN — PANTOPRAZOLE SODIUM 40 MG: 40 TABLET, DELAYED RELEASE ORAL at 05:04

## 2021-04-04 RX ADMIN — MUPIROCIN 1 APPLICATION: 20 OINTMENT TOPICAL at 21:07

## 2021-04-04 RX ADMIN — POTASSIUM CHLORIDE 20 MEQ: 1500 TABLET, EXTENDED RELEASE ORAL at 08:51

## 2021-04-04 RX ADMIN — DOCUSATE SODIUM 100 MG: 100 CAPSULE, LIQUID FILLED ORAL at 08:51

## 2021-04-04 RX ADMIN — METOPROLOL TARTRATE 100 MG: 50 TABLET, FILM COATED ORAL at 08:51

## 2021-04-04 RX ADMIN — POTASSIUM CHLORIDE 20 MEQ: 1500 TABLET, EXTENDED RELEASE ORAL at 18:19

## 2021-04-04 RX ADMIN — FUROSEMIDE 40 MG: 10 INJECTION, SOLUTION INTRAVENOUS at 05:02

## 2021-04-04 RX ADMIN — ATORVASTATIN CALCIUM 80 MG: 80 TABLET, FILM COATED ORAL at 18:19

## 2021-04-04 RX ADMIN — MUPIROCIN 1 APPLICATION: 20 OINTMENT TOPICAL at 08:51

## 2021-04-04 RX ADMIN — FUROSEMIDE 40 MG: 10 INJECTION, SOLUTION INTRAVENOUS at 18:20

## 2021-04-05 VITALS
SYSTOLIC BLOOD PRESSURE: 148 MMHG | DIASTOLIC BLOOD PRESSURE: 61 MMHG | TEMPERATURE: 99.1 F | RESPIRATION RATE: 20 BRPM | OXYGEN SATURATION: 95 % | HEART RATE: 77 BPM | BODY MASS INDEX: 38.54 KG/M2 | WEIGHT: 209.44 LBS | HEIGHT: 62 IN

## 2021-04-05 LAB
GLUCOSE SERPL-MCNC: 127 MG/DL (ref 65–140)
GLUCOSE SERPL-MCNC: 150 MG/DL (ref 65–140)

## 2021-04-05 PROCEDURE — 97116 GAIT TRAINING THERAPY: CPT

## 2021-04-05 PROCEDURE — 99024 POSTOP FOLLOW-UP VISIT: CPT | Performed by: THORACIC SURGERY (CARDIOTHORACIC VASCULAR SURGERY)

## 2021-04-05 PROCEDURE — 97110 THERAPEUTIC EXERCISES: CPT

## 2021-04-05 PROCEDURE — 82948 REAGENT STRIP/BLOOD GLUCOSE: CPT

## 2021-04-05 PROCEDURE — 99232 SBSQ HOSP IP/OBS MODERATE 35: CPT | Performed by: INTERNAL MEDICINE

## 2021-04-05 PROCEDURE — 99024 POSTOP FOLLOW-UP VISIT: CPT | Performed by: PHYSICIAN ASSISTANT

## 2021-04-05 RX ORDER — ACETAMINOPHEN 325 MG/1
650 TABLET ORAL EVERY 6 HOURS PRN
Qty: 30 TABLET | Refills: 0 | Status: SHIPPED | OUTPATIENT
Start: 2021-04-05

## 2021-04-05 RX ORDER — LISINOPRIL 20 MG/1
20 TABLET ORAL DAILY
Qty: 30 TABLET | Refills: 2 | Status: SHIPPED | OUTPATIENT
Start: 2021-04-05 | End: 2021-04-08 | Stop reason: SDUPTHER

## 2021-04-05 RX ORDER — ASPIRIN 325 MG
325 TABLET ORAL DAILY
Qty: 30 TABLET | Refills: 2 | Status: SHIPPED | OUTPATIENT
Start: 2021-04-05 | End: 2021-06-08 | Stop reason: SDUPTHER

## 2021-04-05 RX ORDER — TORSEMIDE 20 MG/1
TABLET ORAL
Qty: 21 TABLET | Refills: 0 | Status: SHIPPED | OUTPATIENT
Start: 2021-04-05 | End: 2021-04-08 | Stop reason: ALTCHOICE

## 2021-04-05 RX ORDER — METOPROLOL TARTRATE 100 MG/1
100 TABLET ORAL EVERY 12 HOURS SCHEDULED
Qty: 60 TABLET | Refills: 2 | Status: SHIPPED | OUTPATIENT
Start: 2021-04-05 | End: 2021-06-08 | Stop reason: SDUPTHER

## 2021-04-05 RX ORDER — DOCUSATE SODIUM 100 MG/1
100 CAPSULE, LIQUID FILLED ORAL 2 TIMES DAILY
Qty: 10 CAPSULE | Refills: 0 | Status: SHIPPED | OUTPATIENT
Start: 2021-04-05

## 2021-04-05 RX ORDER — OXYCODONE HYDROCHLORIDE 5 MG/1
5 TABLET ORAL EVERY 6 HOURS PRN
Qty: 28 TABLET | Refills: 0 | Status: SHIPPED | OUTPATIENT
Start: 2021-04-05 | End: 2021-04-15

## 2021-04-05 RX ORDER — POTASSIUM CHLORIDE 20 MEQ/1
TABLET, EXTENDED RELEASE ORAL
Qty: 21 TABLET | Refills: 0 | Status: SHIPPED | OUTPATIENT
Start: 2021-04-05 | End: 2021-04-08 | Stop reason: ALTCHOICE

## 2021-04-05 RX ORDER — LISINOPRIL 20 MG/1
20 TABLET ORAL DAILY
Status: DISCONTINUED | OUTPATIENT
Start: 2021-04-05 | End: 2021-04-05 | Stop reason: HOSPADM

## 2021-04-05 RX ORDER — POLYETHYLENE GLYCOL 3350 17 G/17G
17 POWDER, FOR SOLUTION ORAL DAILY
Qty: 10 EACH | Refills: 0 | Status: SHIPPED | OUTPATIENT
Start: 2021-04-05

## 2021-04-05 RX ADMIN — AMIODARONE HYDROCHLORIDE 200 MG: 200 TABLET ORAL at 06:28

## 2021-04-05 RX ADMIN — METOPROLOL TARTRATE 100 MG: 50 TABLET, FILM COATED ORAL at 09:20

## 2021-04-05 RX ADMIN — ASPIRIN 325 MG ORAL TABLET 325 MG: 325 PILL ORAL at 09:22

## 2021-04-05 RX ADMIN — MUPIROCIN 1 APPLICATION: 20 OINTMENT TOPICAL at 09:22

## 2021-04-05 RX ADMIN — FUROSEMIDE 40 MG: 10 INJECTION, SOLUTION INTRAVENOUS at 06:29

## 2021-04-05 RX ADMIN — PANTOPRAZOLE SODIUM 40 MG: 40 TABLET, DELAYED RELEASE ORAL at 06:28

## 2021-04-05 RX ADMIN — LISINOPRIL 20 MG: 20 TABLET ORAL at 09:22

## 2021-04-05 RX ADMIN — POTASSIUM CHLORIDE 20 MEQ: 1500 TABLET, EXTENDED RELEASE ORAL at 09:22

## 2021-04-07 NOTE — PROGRESS NOTES
Hospital Follow Up   Office Visit Note  Paul Torres   78 y o    female   MRN: [id number]  [address]  [address] [place] [address] [id number] [phone]    PCP: Baldev Godwin DO  Cardiologist: Will be Dr Piña            Summary of recommendations  Heart healthy diet  Educational information provided  Decrease lisinopril to 10 mg/d ,   Decrease torsemide to 20 mg /K to 20 meq until 4/19 then stop diuretic and potassium  periodic home blood pressure monitoring  Has VNA  F/U CTS 4/30/21  Follow up will be scheduled with Alexandra 4-6 weeks          Assessment/plan  Late presentation NSTEMI> trop>40>LHC: MV CAD>S/P CABGx 4, VG->PL, VG-PDA, VG-Diag, LIMA- LAD, adm 3/23-4/5/21   On aspirin 325mg, statin, beta-blocker, torsemide 20 mg BID x 7 days, then 20 mg daily x 7 days, then stop, (ends 4/19)  Will drop her diuretic down to 20 mg daily today  Hypertension, essential   BP 96/62 on metoprolol tartrate 100 mg q12h  previously on atenolol b i d  Diuretics  She has symptomatic hypotension  Decrease her diuretic and her ACE-inhibitor  Monitor  She has VNA  PSVT, on a beta-blocker  Hyperlipidemia, on atorvastatin 80 mg daily     Non   Type 2 diabetes mellitus , hemoglobin A1C 6 4, on metformin  Obesity, BMI 38  Hx COVID 19 3/28/21  Former tobacco use, 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, in remission  Cardiac testing   TTE 3/23/21 EF 50%  severe hypokinesis of the basal-mid inferior and basal-mid inferolateral wall(s)  Grade 1 DD  Mild MR   Cardiac catheterization 3/23/21  Left main: Normal   LAD: The vessel was normal sized  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, involving the bifurcation  There was a 70% LAD stenosis just proximal to the aneurysm  There was an 80% D1 ostial stenosis at its origin  from the aneurysmal area  Circumflex: The vessel was normal sized   There was 90% distal stenosis just before the origin of the large OM2 branch  RCA: The vessel was medium sized and dominant, giving rise to the PDA  There was a 70% stenosis in mid vessel  Summary:3 vessel CAD  Large aneurysm of prox LAD                HPI  Leopoldo Poole is a 79 yo female with diabetes, hypertension, dyslipidemia, PSVT, and obesity  She has no known history of prior CAD or heart failure  She is a remote smoker, in remission about 4 years after > 35 pk yr hx  Interval history  Adm 3/23-4/5/21 chest pain  Initial ECG had minor ST elevations in inferior leads with ST depression in I, aVL concerning for injury  Subsequent ECGs is appears to have improved, but she has T-wave inversions--> late presentation type 1 non STEMI  Her echocardiogram showed an ejection fraction of 50% with severe hypokinesis of the basal mid inferior and basal mid inferolateral walls, grade 1 diastolic dysfunction, mild MR  Her troponin peaked at greater than 40  Cardiac catheterization was pursued  This demonstrated 3 vessel disease with a large aneurysm of the proximal LAD  She was transferred to [place] and underwent CT surgery evaluation  Once testing was complete, she underwent CABG 3/31/21  She was given IV Lasix postop for volume overload  She was discharged on torsemide 20 mg bid x 7 d, then daily, along with supplemental potassium  Lisinopril was uptitrated to optimize BP control      4/8/21  Hospital follow-up  She is accompanied by her daughter and grandchildren  Her daughter and grandchildren are in the car  Today, she is in the wheelchair  She tells  me she is dizzy and sometimes lightheaded  Her systolic blood pressure is 96  Will decrease her ACE-inhibitor and her diuretic  She was on a decreasing diuretic regimen until the 19th  Will drop the torsemide down to 20 mg daily today, as well as potassium  She has visiting nurses on board  They will call us if her blood pressure remains low and or she is symptomatic    She had recent labs April 4th which were reviewed  Her renal function, electrolytes and blood counts were satisfactory  She has some minor incisional discomfort  Her incisions are healing nicely  She does have some ecchymosis over the right upper chest which appears to be in the healing phase  She has persistent lower extremity edema, about 2+  Her vein sites are satisfactory, healing            I have spent 40 minutes with Patient  today in which greater than 50% of this time was spent in counseling/coordination of care regarding Intructions for management, Patient and family education, Importance of tx compliance and Risk factor reductions  Assessment  Diagnoses and all orders for this visit:    Hospital discharge follow-up    S/P CABG (coronary artery bypass graft)    NSTEMI (non-ST elevated myocardial infarction) ([place] [address] )    Coronary artery disease involving native coronary artery of native heart with angina pectoris ([place] [address] )    Essential hypertension    Pure hypercholesterolemia          Past Medical History:   Diagnosis Date    Diabetes mellitus ([place] [address] )     Hyperlipidemia     Hypertension     Paroxysmal SVT (supraventricular tachycardia) ([place] [address] )        Review of Systems   Constitution: Positive for malaise/fatigue  Negative for chills  Cardiovascular: Negative for chest pain, claudication, cyanosis, dyspnea on exertion, irregular heartbeat, leg swelling, near-syncope, orthopnea, palpitations, paroxysmal nocturnal dyspnea and syncope  Respiratory: Negative for cough and shortness of breath  Gastrointestinal: Negative for heartburn and nausea  Neurological: Positive for dizziness  Negative for focal weakness, headaches, light-headedness and weakness  All other systems reviewed and are negative  No Known Allergies        Current Outpatient Medications:     acetaminophen (TYLENOL) 325 mg tablet, Take 2 tablets (650 mg total) by mouth every 6 (six) hours as needed for mild pain or moderate pain, Disp: 30 tablet, Rfl: 0   aspirin 325 mg tablet, Take 1 tablet (325 mg total) by mouth daily, Disp: 30 tablet, Rfl: 2    atorvastatin (LIPITOR) 80 mg tablet, Take 80 mg by mouth daily, Disp: , Rfl:     lisinopril (ZESTRIL) 20 mg tablet, Take 1 tablet (20 mg total) by mouth daily, Disp: 30 tablet, Rfl: 2    metFORMIN (GLUCOPHAGE) 850 mg tablet, Take 850 mg by mouth Three times a day, Disp: , Rfl:     metoprolol tartrate (LOPRESSOR) 100 mg tablet, Take 1 tablet (100 mg total) by mouth every 12 (twelve) hours, Disp: 60 tablet, Rfl: 2    Omeprazole 20 MG TBEC, Take 20 mg by mouth daily, Disp: , Rfl:     potassium chloride (K-DUR,KLOR-CON) 20 mEq tablet, Take 1 tablet (20 mEq total) by mouth 2 (two) times a day for 7 days, THEN 1 tablet (20 mEq total) daily for 7 days  , Disp: 21 tablet, Rfl: 0    torsemide (DEMADEX) 20 mg tablet, Take 1 tablet (20 mg total) by mouth 2 (two) times a day for 7 days, THEN 1 tablet (20 mg total) daily for 7 days  , Disp: 21 tablet, Rfl: 0    docusate sodium (COLACE) 100 mg capsule, Take 1 capsule (100 mg total) by mouth 2 (two) times a day (Patient not taking: Reported on 4/8/2021), Disp: 10 capsule, Rfl: 0    oxyCODONE (ROXICODONE) 5 mg immediate release tablet, Take 1 tablet (5 mg total) by mouth every 6 (six) hours as needed for severe pain for up to 10 daysMax Daily Amount: 20 mg (Patient not taking: Reported on 4/8/2021), Disp: 28 tablet, Rfl: 0    polyethylene glycol (MIRALAX) 17 g packet, Take 17 g by mouth daily (Patient not taking: Reported on 4/8/2021), Disp: 10 each, Rfl: 0        Social History     Socioeconomic History    Marital status: /Civil Union     Spouse name: Not on file    Number of children: Not on file    Years of education: Not on file    Highest education level: Not on file   Occupational History    Not on file   Social Needs    Financial resource strain: Not on file    Food insecurity     Worry: Not on file     Inability: Not on file   [employer] needs Medical: Not on file     Non-medical: Not on file   Tobacco Use    Smoking status: Former Smoker     Packs/day: 1 00     Years: 30 00     Pack years: 30 00     Types: Cigarettes     Quit date: 2017     Years since quittin 2    Smokeless tobacco: Never Used   Substance and Sexual Activity    Alcohol use: Not Currently    Drug use: Not Currently    Sexual activity: Not on file   Lifestyle    Physical activity     Days per week: Not on file     Minutes per session: Not on file    Stress: Not on file   Relationships    Social connections     Talks on phone: Not on file     Gets together: Not on file     Attends [employer] witness service: Not on file     Active member of club or organization: Not on file     Attends meetings of clubs or organizations: Not on file     Relationship status: Not on file    Intimate partner violence     Fear of current or ex partner: Not on file     Emotionally abused: Not on file     Physically abused: Not on file     Forced sexual activity: Not on file   Other Topics Concern    Not on file   Social History Narrative    Not on file       Family History   Problem Relation Age of Onset    Heart attack Sister     Breast cancer Sister     Heart attack Brother        Physical Exam   Constitutional: She is oriented to person, place, and time  No distress  HENT:   Head: Normocephalic and atraumatic  Eyes: Conjunctivae and EOM are normal    Neck: Normal range of motion  Neck supple  Cardiovascular: Normal rate, regular rhythm, normal heart sounds and intact distal pulses  Pulmonary/Chest: Effort normal and breath sounds normal    Abdominal: Soft  Bowel sounds are normal    Musculoskeletal: Normal range of motion  Neurological: She is alert and oriented to person, place, and time  Skin: Skin is warm and dry  She is not diaphoretic  Psychiatric: She has a normal mood and affect  Nursing note and vitals reviewed        Vitals: Blood pressure 96/62, pulse 68, height 5' 2" (1 575 m), SpO2 96 %  Wt Readings from Last 3 Encounters:   04/05/21 95 kg (209 lb 7 oz)   03/23/21 97 1 kg (214 lb 1 1 oz)         Labs & Results:  Lab Results   Component Value Date    WBC 8 74 04/04/2021    HGB 11 8 04/04/2021    HCT 37 8 04/04/2021    MCV 92 04/04/2021     04/04/2021     No results found for: BNP  No components found for: CHEM  Troponin I   Date Value Ref Range Status   03/23/2021 >40 00 (H) <=0 04 ng/mL Final     Comment:     Autovalidation override  S[employer] Chemistry analyzer 99% cutoff is > 0 04 ng/mL in network labs     o cTnI 99% cutoff is useful only when applied to patients in the clinical setting of myocardial ischemia   o cTnI 99% cutoff should be interpreted in the context of clinical history, ECG findings and possibly cardiac imaging to establish correct diagnosis  o cTnI 99% cutoff may be suggestive but clearly not indicative of a coronary event without the clinical setting of myocardial ischemia      03/23/2021 >40 00 (H) <=0 04 ng/mL Final     Comment:     Autovalidation override  Siemens Chemistry analyzer 99% cutoff is > 0 04 ng/mL in network labs     o cTnI 99% cutoff is useful only when applied to patients in the clinical setting of myocardial ischemia   o cTnI 99% cutoff should be interpreted in the context of clinical history, ECG findings and possibly cardiac imaging to establish correct diagnosis  o cTnI 99% cutoff may be suggestive but clearly not indicative of a coronary event without the clinical setting of myocardial ischemia      03/23/2021 >40 00 (H) <=0 04 ng/mL Final     Comment:     Autovalidation override  Siemens Chemistry analyzer 99% cutoff is > 0 04 ng/mL in network labs     o cTnI 99% cutoff is useful only when applied to patients in the clinical setting of myocardial ischemia   o cTnI 99% cutoff should be interpreted in the context of clinical history, ECG findings and possibly cardiac imaging to establish correct diagnosis     o cTnI 99% cutoff may be suggestive but clearly not indicative of a coronary event without the clinical setting of myocardial ischemia  Results for orders placed during the hospital encounter of 21   Echo complete with contrast if indicated    Narrative [facility] [address], [address] [facility]  [phone]    Transthoracic Echocardiogram  2D, M-mode, Doppler, and Color Doppler    Study date:  23-Mar-2021    Patient: Lalitha Maldonado  MR number: [id number]  Account number: [de-identified]  : 1945  Age: 70 years  Gender: Female  Status: Inpatient  Location: Bedside  Height: 62 in  Weight: 213 6 lb  BP: 167/ 81 mmHg    Indications: Non-ST elevation MI, SVT  Diagnoses: I21 4 - Non-ST elevation (NSTEMI) myocardial infarction    Sonographer:  RADHA Rueda  Primary Physician:  Faith Lang DO  Referring Physician:  SHMUEL Worrell  Group:  [facility]  Interpreting Physician:  Srinivasan August MD    SUMMARY    PROCEDURE INFORMATION:  This was a technically difficult study  LEFT VENTRICLE:  Size was normal   Systolic function was at the lower limits of normal  Ejection fraction was estimated to be 50 %  There was severe hypokinesis of the basal-mid inferior and basal-mid inferolateral wall(s)  Doppler parameters were consistent with abnormal left ventricular relaxation (grade 1 diastolic dysfunction)  MITRAL VALVE:  There was mild annular calcification  There was mild regurgitation  AORTIC VALVE:  The valve was trileaflet  Leaflets exhibited mild calcification, lower normal cuspal separation, and sclerosis  The peak valve velocity was 179 cm/s  Valve mean gradient was 6 mmHg  TRICUSPID VALVE:  There was trace regurgitation  HISTORY: PRIOR HISTORY: HTN, DM, GERD  PROCEDURE: The procedure was performed at the bedside  This was a routine study  The transthoracic approach was used   The study included complete 2D imaging, M-mode, complete spectral Doppler, and color Doppler  The heart rate was 71 bpm,  at the start of the study  Images were obtained from the parasternal, apical, subcostal, and suprasternal notch acoustic windows  Echocardiographic views were limited due to poor acoustic window availability, decreased penetration, and  lung interference  This was a technically difficult study  LEFT VENTRICLE: Size was normal  Systolic function was at the lower limits of normal  Ejection fraction was estimated to be 50 %  There was severe hypokinesis of the basal-mid inferior and basal-mid inferolateral wall(s)  Wall thickness  was normal  DOPPLER: Doppler parameters were consistent with abnormal left ventricular relaxation (grade 1 diastolic dysfunction)  RIGHT VENTRICLE: The size was normal  Systolic function was normal  Wall thickness was normal     LEFT ATRIUM: Size was normal     RIGHT ATRIUM: Size was normal     MITRAL VALVE: There was mild annular calcification  Valve structure was normal  There was normal leaflet separation  DOPPLER: The transmitral velocity was within the normal range  There was no evidence for stenosis  There was mild  regurgitation  AORTIC VALVE: The valve was trileaflet  Leaflets exhibited mild calcification, lower normal cuspal separation, and sclerosis  DOPPLER: Transaortic velocity was minimally increased  There was no evidence for stenosis  There was no  significant regurgitation  TRICUSPID VALVE: The valve structure was normal  There was normal leaflet separation  DOPPLER: The transtricuspid velocity was within the normal range  There was no evidence for stenosis  There was trace regurgitation  PULMONIC VALVE: Leaflets exhibited normal thickness, no calcification, and normal cuspal separation  DOPPLER: The transpulmonic velocity was within the normal range  There was no significant regurgitation  PERICARDIUM: There was no pericardial effusion  The pericardium was normal in appearance      AORTA: The root exhibited normal size  SYSTEMIC VEINS: IVC: The inferior vena cava was normal in size  Respirophasic changes were normal     MEASUREMENT TABLES    DOPPLER MEASUREMENTS  Aortic valve   (Reference normals)  Peak stefan   179 cm/s   (--)  Mean stefan   114 cm/s   (--)  VTI   35 cm   (--)  Peak gradient   19 mmHg   (--)  Mean gradient   6 mmHg   (--)    SYSTEM MEASUREMENT TABLES    CW  AV Env  Ti: 304 47 ms  AV MaxP 76 mmHg  AV VTI: 34 61 cm  AV Vmax: 1 79 m/s  AV Vmean: 1 14 m/s  AV meanP 92 mmHg    MM  TAPSE: 2 09 cm    PW  E' Sept: 0 05 m/s  E/E' Sept: 14 2  LVOT Env  Ti: 308 28 ms  LVOT VTI: 17 88 cm  LVOT Vmax: 0 93 m/s  LVOT Vmean: 0 58 m/s  LVOT maxPG: 3 47 mmHg  LVOT meanP 59 mmHg  MV A Stefan: 0 82 m/s  MV Dec Schuyler: 2 57 m/s2  MV DecT: 252 83 ms  MV E Stefan: 0 65 m/s  MV E/A Ratio: 0 81  MV PHT: 73 32 ms  MVA By PHT: 3 cm2    [address] Accredited E[facility]    Prepared and electronically signed by    Anjum Anthony MD  Signed 23-Mar-2021 15:10:29       No results found for this or any previous visit  This note was completed in part utilizing [employer] direct voice recognition software  Grammatical errors, random word insertion, spelling mistakes, and incomplete sentences may be an occasional consequence of the system secondary to software limitations, ambient noise and hardware issues  At the time of dictation, efforts were made to edit, clarify and /or correct errors  Please read the chart carefully and recognize, using context, where substitutions have occurred    If you have any questions or concerns about the context, text or information contained within the body of this dictation, please contact myself, the provider, for further clarification

## 2021-04-08 ENCOUNTER — OFFICE VISIT (OUTPATIENT)
Dept: CARDIOLOGY CLINIC | Facility: CLINIC | Age: 76
End: 2021-04-08
Payer: COMMERCIAL

## 2021-04-08 VITALS
HEART RATE: 68 BPM | OXYGEN SATURATION: 96 % | SYSTOLIC BLOOD PRESSURE: 96 MMHG | BODY MASS INDEX: 38.31 KG/M2 | DIASTOLIC BLOOD PRESSURE: 62 MMHG | HEIGHT: 62 IN

## 2021-04-08 DIAGNOSIS — I25.119 CORONARY ARTERY DISEASE INVOLVING NATIVE CORONARY ARTERY OF NATIVE HEART WITH ANGINA PECTORIS (HCC): ICD-10-CM

## 2021-04-08 DIAGNOSIS — Z95.1 S/P CABG (CORONARY ARTERY BYPASS GRAFT): ICD-10-CM

## 2021-04-08 DIAGNOSIS — I21.4 NSTEMI (NON-ST ELEVATED MYOCARDIAL INFARCTION) (HCC): ICD-10-CM

## 2021-04-08 DIAGNOSIS — I10 ESSENTIAL HYPERTENSION: ICD-10-CM

## 2021-04-08 DIAGNOSIS — Z09 HOSPITAL DISCHARGE FOLLOW-UP: Primary | ICD-10-CM

## 2021-04-08 DIAGNOSIS — E78.00 PURE HYPERCHOLESTEROLEMIA: ICD-10-CM

## 2021-04-08 PROCEDURE — 99214 OFFICE O/P EST MOD 30 MIN: CPT | Performed by: NURSE PRACTITIONER

## 2021-04-08 RX ORDER — LISINOPRIL 20 MG/1
10 TABLET ORAL DAILY
Qty: 30 TABLET | Refills: 2
Start: 2021-04-08 | End: 2021-06-08 | Stop reason: SDUPTHER

## 2021-04-08 RX ORDER — TORSEMIDE 20 MG/1
TABLET ORAL
Start: 2021-04-08

## 2021-04-08 RX ORDER — POTASSIUM CHLORIDE 20 MEQ/1
TABLET, EXTENDED RELEASE ORAL
Start: 2021-04-08

## 2021-04-08 NOTE — PATIENT INSTRUCTIONS
DASH Eating Plan   WHAT YOU NEED TO KNOW:   The DASH (Dietary Approaches to Stop Hypertension) Eating Plan is designed to help prevent or lower high blood pressure  It can also help to lower LDL (bad) cholesterol and decrease your risk of heart disease  The plan is low in sodium, sugar, unhealthy fats, and total fat  It is high in potassium, calcium, magnesium, and fiber  These nutrients are added when you eat more fruits, vegetables, and whole grains  DISCHARGE INSTRUCTIONS:   Your sodium limit each day: Your dietitian will tell you how much sodium is safe for you to have each day  People with high blood pressure should have no more than 1,500 to 2,300 mg of sodium in a day  A teaspoon (tsp) of salt has 2,300 mg of sodium  This may seem like a difficult goal, but small changes to the foods you eat can make a big difference  Your healthcare provider or dietitian can help you create a meal plan that follows your sodium limit  How to limit sodium:   · Read food labels  Food labels can help you choose foods that are low in sodium  The amount of sodium is listed in milligrams (mg)  The % Daily Value (DV) column tells you how much of your daily needs are met by 1 serving of the food for each nutrient listed  Choose foods that have less than 5% of the DV of sodium  These foods are considered low in sodium  Foods that have 20% or more of the DV of sodium are considered high in sodium  Avoid foods that have more than 300 mg of sodium in each serving  Choose foods that say low-sodium, reduced-sodium, or no salt added on the food label  · Avoid salt  Do not salt food at the table, and add very little salt to foods during cooking  Use herbs and spices, such as onions, garlic, and salt-free seasonings to add flavor to foods  Try lemon or lime juice or vinegar to give foods a tart flavor  Use hot peppers or a small amount of hot pepper sauce to add a spicy flavor to foods  · Ask about salt substitutes    Ask your healthcare provider if you may use salt substitutes  Some salt substitutes have ingredients that can be harmful if you have certain health conditions  · Choose foods carefully at restaurants  Meals from restaurants, especially fast food restaurants, are often high in sodium  Some restaurants have nutrition information that tells you the amount of sodium in their foods  Ask to have your food prepared with less, or no salt  What you need to know about fats:   · Include healthy fats  Examples are unsaturated fats and omega-3 fatty acids  Unsaturated fats are found in soybean, canola, olive, or sunflower oil, and liquid and soft tub margarines  Omega-3 fatty acids are found in fatty fish, such as salmon, tuna, mackerel, and sardines  It is also found in flaxseed oil and ground flaxseed  · Avoid unhealthy fats  Do not eat unhealthy fats, such as saturated fats and trans fats  Saturated fats are found in foods that contain fat from animals  Examples are fatty meats, whole milk, butter, cream, and other dairy foods  It is also found in shortening, stick margarine, palm oil, and coconut oil  Trans fats are found in fried foods, crackers, chips, and baked goods made with margarine or shortening  Foods to include: With the DASH eating plan, you need to eat a certain number of servings from each food group  This will help you get enough of certain nutrients and limit others  The amount of servings you should eat depends on how many calories you need  Your dietitian can tell you how many calories you need  The number of servings listed next to the food groups below are for people who need about 2,000 calories each day  · Grains:  6 to 8 servings (3 of these servings should be whole-grain foods)    ? 1 slice of whole-grain bread     ? 1 ounce of dry cereal    ? ½ cup of cooked cereal, pasta, or brown rice    · Vegetables and fruits:  4 to 5 servings of fruits and 4 to 5 servings of vegetables    ?  1 medium fruit    ? ½ cup of frozen, canned (no added salt), or chopped fresh vegetables     ? ½ cup of fresh, frozen, dried, or canned fruit (canned in light syrup or fruit juice)    ? ½ cup of vegetable or fruit juice    · Dairy:  2 to 3 servings    ? 1 cup of nonfat (skim) or 1% milk    ? 1½ ounces of fat-free or low-fat cheese    ? 6 ounces of nonfat or low-fat yogurt    · Lean meat, poultry, and fish:  6 ounces or less    ? Poultry (chicken, turkey) with no skin    ? Fish (especially fatty fish, such as salmon, fresh tuna, or mackerel)    ? Lean beef and pork (loin, round, extra lean hamburger)    ? Egg whites and egg substitutes    · Nuts, seeds, and legumes:  4 to 5 servings each week    ? ½ cup of cooked beans and peas    ? 1½ ounces of unsalted nuts    ? 2 tablespoons of peanut butter or seeds    · Sweets and added sugars:  5 or less each week    ? 1 tablespoon of sugar, jelly, or jam    ? ½ cup of sorbet or gelatin    ? 1 cup of lemonade    · Fats:  2 to 3 servings each week    ? 1 teaspoon of soft margarine or vegetable oil    ? 1 tablespoon of mayonnaise    ? 2 tablespoons of salad dressing    Foods to avoid:   · Grains:      ? Baked goods, such as doughnuts, pastries, cookies, and biscuits (high in fat and sugar)    ? Mixes for cornbread and biscuits, packaged foods, such as bread stuffing, rice and pasta mixes, macaroni and cheese, and instant cereals (high in sodium)    · Fruits and vegetables:      ? Regular, canned vegetables (high in sodium)    ? Sauerkraut, pickled vegetables, and other foods prepared in brine (high in sodium)    ? Fried vegetables or vegetables in butter or high-fat sauces    ? Fruit in cream or butter sauce (high in fat)    · Dairy:      ? Whole milk, 2% milk, and cream (high in fat)    ?  Regular cheese and processed cheese (high in fat and sodium)    · Meats and protein foods:      ? Smoked or cured meat, such as corned beef, vazquez, ham, hot dogs, and sausage (high in fat and sodium)    ? Canned beans and canned meats or spreads, such as potted meats, sardines, anchovies, and imitation seafood (high in sodium)    ? Deli or lunch meats, such as bologna, ham, turkey, and roast beef (high in sodium)    ? High-fat meat (T-bone steak, regular hamburger, and ribs)    ? Whole eggs and egg yolks (high in fat)    · Other:      ? Seasonings made with salt, such as garlic salt, celery salt, onion salt, seasoned salt, meat tenderizers, and monosodium glutamate (MSG)    ? Miso soup and canned or dried soup mixes (high in sodium)    ? Regular soy sauce, barbecue sauce, teriyaki sauce, steak sauce, Worcestershire sauce, and most flavored vinegars (high in sodium)    ? Regular condiments, such as mustard, ketchup, and salad dressings (high in sodium)    ? Gravy and sauces, such as Parviz or cheese sauces (high in sodium and fat)    ? Drinks high in sugar, such as soda or fruit drinks    ? Snack foods, such as salted chips, popcorn, pretzels, pork rinds, salted crackers, and salted nuts    ? Frozen foods, such as dinners, entrees, vegetables with sauces, and breaded meats (high in sodium)    Other guidelines to follow:   · Maintain a healthy weight  Your risk for heart disease is higher if you are overweight  Your healthcare provider may suggest that you lose weight if you are overweight  You can lose weight by eating fewer calories and foods that have added sugars and fat  The DASH meal plan can help you do this  Decrease calories by eating smaller portions at each meal and fewer snacks  Ask your healthcare provider for more information about how to lose weight  · Exercise regularly  Regular exercise can help you reach or maintain a healthy weight  Regular exercise can also help decrease your blood pressure and improve your cholesterol levels  Get 30 minutes or more of moderate exercise each day of the week  To lose weight, get at least 60 minutes of exercise   Talk to your healthcare provider about the best exercise program for you  · Limit alcohol  Women should limit alcohol to 1 drink a day  Men should limit alcohol to 2 drinks a day  A drink of alcohol is 12 ounces of beer, 5 ounces of wine, or 1½ ounces of liquor  © Copyright [address] Information is for End User's use only and may not be sold, redistributed or otherwise used for commercial purposes  All illustrations and images included in CareNotes® are the copyrighted property of [employer]  or [facility] Hany Garcia   The above information is an  only  It is not intended as medical advice for individual conditions or treatments  Talk to your doctor, nurse or pharmacist before following any medical regimen to see if it is safe and effective for you  Mediterranean Diet   AMBULATORY CARE:   A Mediterranean diet  is a meal plan that includes foods that are commonly eaten in countries that border the [facility]  This meal plan may provide several health benefits  These include losing or maintaining weight, and decreasing blood pressure, blood sugar, and cholesterol levels  It may also help protect against certain health conditions such as heart disease, cancer, type 2 diabetes, and Alzheimer disease  Work with a dietitian to develop a meal plan that is right for you  Foods to include in the [facility] diet:   · Include fruits and vegetables in each meal   Eat a variety of fresh fruits and vegetables  · Choose whole grains every day  These foods include whole-grain breads, pastas, and cereals  It also includes brown rice, quinoa, and millet  · Use unsaturated fats instead of saturated fats  Cook with olive or canola oil  Limit saturated fats, such as butter, margarine, and shortening  Saturated fat is an unhealthy fat that can increase your cholesterol levels  · Choose plant foods, poultry, and fish as your main sources of protein        ? Eat plant-based foods that provide protein,  such as lentils, beans, chickpeas, nuts, and seeds  Choose mostly plant-based foods in place of meat on most days of the week  ? Eat protein foods high in omega-3 fats  Fish high in omega-3 fats include salmon, trout, and tuna  Include these types of fish 1 or 2 times each week  Limit fish high in mercury, such as shark, swordfish, tilefish, and vivian mackerel  Omega-3 fats are also found in walnuts and flaxseed  ? Choose poultry (chicken or turkey)  without skin instead of red meat  Red meat is high in saturated fat  Limit eggs and high-fat meats, such as vazquez, sausage, and hot dogs  · Choose low-fat dairy foods  such as nonfat or 1% milk, or low-fat almond, cashew, or soy milk  Other examples include low-fat cheese, yogurt, and cottage cheese  · Limit sweets  Limit your intake of high-sugar foods, such as soda, desserts, and candy  · Talk to your healthcare provider about alcohol  Studies have shown that moderate intake of wine may reduce the risk of heart disease  A moderate amount of wine is 1 serving for women and men 65 years and older each day  Two servings is recommended for men 24to 59years of age each day  A serving of wine is 5 ounces  Other things you need to know if you follow the Mediterranean diet:   · Include foods high in iron and vitamin C   Plant-based foods that are high in iron include spinach, beans, tofu, and artichoke  Eat a serving of vitamin C with any iron-rich food to help your body absorb more iron  Examples include oranges, strawberries, cantaloupe, broccoli, and yellow peppers  · Get regular physical activity  The Mediterranean diet will have the most benefit if you get regular physical activity  Get 30 minutes of physical activity at least 5 days a week  Choose physical activities that increase your heart rate  Examples include walking, hiking, swimming, and riding a bike  Ask your healthcare provider about the best exercise plan for you      © Copyright [facility] Information is for [employer] use only and may not be sold, redistributed or otherwise used for commercial purposes  All illustrations and images included in CareNotes® are the copyrighted property of [employer]  or Mirna Garcia   The above information is an  only  It is not intended as medical advice for individual conditions or treatments  Talk to your doctor, nurse or pharmacist before following any medical regimen to see if it is safe and effective for you

## 2021-04-08 NOTE — LETTER
April 8, 2021     Jabari Teran DO  [place] [address]  [id number]    Patient: Howard Shultz   YOB: 1945   Date of Visit: 4/8/2021       Dear Dr Jayesh Bennett: Thank you for referring Howard Shultz to me for evaluation  Below are my notes for this consultation  If you have questions, please do not hesitate to call me  I look forward to following your patient along with you  Sincerely,        SHMUEL Saini        CC: MD Saumya Tristan, [facility]  4/8/2021 10:21 AM  Sign when [facility] Follow Up   Office Visit Note  Howard Shultz   78 y o    female   MRN: [id number]  [address]  [address]  MARKEL [address] [place]ovanna Cazares [place] [address]  [phone]  [phone]    PCP: Jabari Teran DO  Cardiologist: Will be Dr Piña            Summary of recommendations  Heart healthy diet  Educational information provided  Decrease lisinopril to 10 mg/d ,   Decrease torsemide to 20 mg /K to 20 meq until 4/19 then stop diuretic and potassium  periodic home blood pressure monitoring  Has VNA  F/U CTS 4/30/21  Follow up will be scheduled with Alexandra 4-6 weeks          Assessment/plan  Late presentation NSTEMI> trop>40>LHC: MV CAD>S/P CABGx 4, VG->PL, VG-PDA, VG-Diag, LIMA- LAD, adm 3/23-4/5/21   On aspirin 325mg, statin, beta-blocker, torsemide 20 mg BID x 7 days, then 20 mg daily x 7 days, then stop, (ends 4/19)  Will drop her diuretic down to 20 mg daily today  Hypertension, essential   BP 96/62 on metoprolol tartrate 100 mg q12h  previously on atenolol b i d  Diuretics  She has symptomatic hypotension  Decrease her diuretic and her ACE-inhibitor  Monitor  She has VNA  PSVT, on a beta-blocker  Hyperlipidemia, on atorvastatin 80 mg daily      Non   Type 2 diabetes mellitus , hemoglobin A1C 6 4, on metformin  Obesity, BMI 38  Hx COVID 19 3/28/21  Former tobacco use, 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, in remission  Cardiac testing   TTE 3/23/21 EF 50%  severe hypokinesis of the basal-mid inferior and basal-mid inferolateral wall(s)  Grade 1 DD  Mild MR   Cardiac catheterization 3/23/21  Left main: Normal   LAD: The vessel was normal sized  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, involving the bifurcation  There was a 70% LAD stenosis just proximal to the aneurysm  There was an 80% D1 ostial stenosis at its origin  from the aneurysmal area  Circumflex: The vessel was normal sized  There was 90% distal stenosis just before the origin of the large OM2 branch  RCA: The vessel was medium sized and dominant, giving rise to the PDA  There was a 70% stenosis in mid vessel  Summary:3 vessel CAD  Large aneurysm of prox LAD                CARLENE Adan is a 77 yo female with diabetes, hypertension, dyslipidemia, PSVT, and obesity  She has no known history of prior CAD or heart failure  She is a remote smoker, in remission about 4 years after > 35 pk yr hx  Interval history  Adm 3/23-4/5/21 chest pain  Initial ECG had minor ST elevations in inferior leads with ST depression in I, aVL concerning for injury  Subsequent ECGs is appears to have improved, but she has T-wave inversions--> late presentation type 1 non STEMI  Her echocardiogram showed an ejection fraction of 50% with severe hypokinesis of the basal mid inferior and basal mid inferolateral walls, grade 1 diastolic dysfunction, mild MR  Her troponin peaked at greater than 40  Cardiac catheterization was pursued  This demonstrated 3 vessel disease with a large aneurysm of the proximal LAD  She was transferred to [facility] and underwent CT surgery evaluation  Once testing was complete, she underwent CABG 3/31/21  She was given IV Lasix postop for volume overload  She was discharged on torsemide 20 mg bid x 7 d, then daily, along with supplemental potassium  Lisinopril was uptitrated to optimize BP control      4/8/21  Hospital follow-up    She is accompanied by her daughter and grandchildren  Her daughter and grandchildren are in the car  Today, she is in the wheelchair  She tells  me she is dizzy and sometimes lightheaded  Her systolic blood pressure is 96  Will decrease her ACE-inhibitor and her diuretic  She was on a decreasing diuretic regimen until the 19th  Will drop the torsemide down to 20 mg daily today, as well as potassium  She has visiting nurses on board  They will call us if her blood pressure remains low and or she is symptomatic  She had recent labs April 4th which were reviewed  Her renal function, electrolytes and blood counts were satisfactory  She has some minor incisional discomfort  Her incisions are healing nicely  She does have some ecchymosis over the right upper chest which appears to be in the healing phase  She has persistent lower extremity edema, about 2+  Her vein sites are satisfactory, healing            I have spent 40 minutes with Patient  today in which greater than 50% of this time was spent in counseling/coordination of care regarding Intructions for management, Patient and family education, Importance of tx compliance and Risk factor reductions  Assessment  Diagnoses and all orders for this visit:    Hospital discharge follow-up    S/P CABG (coronary artery bypass graft)    NSTEMI (non-ST elevated myocardial infarction) ([facility] )    Coronary artery disease involving native coronary artery of native heart with angina pectoris ([facility] )    Essential hypertension    Pure hypercholesterolemia          Past Medical History:   Diagnosis Date    [facility] )     Hyperlipidemia     Hypertension     Paroxysmal SVT (supraventricular tachycardia) ([facility] )        Review of Systems   Constitution: Positive for malaise/fatigue  Negative for chills     Cardiovascular: Negative for chest pain, claudication, cyanosis, dyspnea on exertion, irregular heartbeat, leg swelling, near-syncope, orthopnea, palpitations, paroxysmal nocturnal dyspnea and syncope  Respiratory: Negative for cough and shortness of breath  Gastrointestinal: Negative for heartburn and nausea  Neurological: Positive for dizziness  Negative for focal weakness, headaches, light-headedness and weakness  All other systems reviewed and are negative  No Known Allergies    Current Outpatient Medications:     acetaminophen (TYLENOL) 325 mg tablet, Take 2 tablets (650 mg total) by mouth every 6 (six) hours as needed for mild pain or moderate pain, Disp: 30 tablet, Rfl: 0    aspirin 325 mg tablet, Take 1 tablet (325 mg total) by mouth daily, Disp: 30 tablet, Rfl: 2    atorvastatin (LIPITOR) 80 mg tablet, Take 80 mg by mouth daily, Disp: , Rfl:     lisinopril (ZESTRIL) 20 mg tablet, Take 1 tablet (20 mg total) by mouth daily, Disp: 30 tablet, Rfl: 2    metFORMIN (GLUCOPHAGE) 850 mg tablet, Take 850 mg by mouth Three times a day, Disp: , Rfl:     metoprolol tartrate (LOPRESSOR) 100 mg tablet, Take 1 tablet (100 mg total) by mouth every 12 (twelve) hours, Disp: 60 tablet, Rfl: 2    Omeprazole 20 MG TBEC, Take 20 mg by mouth daily, Disp: , Rfl:     potassium chloride (K-DUR,KLOR-CON) 20 mEq tablet, Take 1 tablet (20 mEq total) by mouth 2 (two) times a day for 7 days, THEN 1 tablet (20 mEq total) daily for 7 days  , Disp: 21 tablet, Rfl: 0    torsemide (DEMADEX) 20 mg tablet, Take 1 tablet (20 mg total) by mouth 2 (two) times a day for 7 days, THEN 1 tablet (20 mg total) daily for 7 days  , Disp: 21 tablet, Rfl: 0    docusate sodium (COLACE) 100 mg capsule, Take 1 capsule (100 mg total) by mouth 2 (two) times a day (Patient not taking: Reported on 4/8/2021), Disp: 10 capsule, Rfl: 0    oxyCODONE (ROXICODONE) 5 mg immediate release tablet, Take 1 tablet (5 mg total) by mouth every 6 (six) hours as needed for severe pain for up to 10 daysMax Daily Amount: 20 mg (Patient not taking: Reported on 4/8/2021), Disp: 28 tablet, Rfl: 0    polyethylene glycol (MIRALAX) 17 g packet, Take 17 g by mouth daily (Patient not taking: Reported on 2021), Disp: 10 each, Rfl: 0        Social History     Socioeconomic History    Marital status: /Civil Union     Spouse name: Not on file    Number of children: Not on file    Years of education: Not on file    Highest education level: Not on file   Occupational History    Not on file   Social Needs    Financial resource strain: Not on file    Food insecurity     Worry: Not on file     Inability: Not on file   [employer] needs     Medical: Not on file     Non-medical: Not on file   Tobacco Use    Smoking status: Former Smoker     Packs/day:  00     Years: 30 00     Pack years: 30 00     Types: Cigarettes     Quit date:      Years since quittin 2    Smokeless tobacco: Never Used   Substance and Sexual Activity    Alcohol use: Not Currently    Drug use: Not Currently    Sexual activity: Not on file   Lifestyle    Physical activity     Days per week: Not on file     Minutes per session: Not on file    Stress: Not on file   Relationships    Social connections     Talks on phone: Not on file     Gets together: Not on file     Attends Congregational service: Not on file     Active member of club or organization: Not on file     Attends meetings of clubs or organizations: Not on file     Relationship status: Not on file    Intimate partner violence     Fear of current or ex partner: Not on file     Emotionally abused: Not on file     Physically abused: Not on file     Forced sexual activity: Not on file   Other Topics Concern    Not on file   Social History Narrative    Not on file       Family History   Problem Relation Age of Onset    Heart attack Sister     Breast cancer Sister     Heart attack Brother        Physical Exam   Constitutional: She is oriented to person, place, and time  No distress  HENT:   Head: Normocephalic and atraumatic     Eyes: Conjunctivae and EOM are normal    Neck: Normal range of motion  Neck supple  Cardiovascular: Normal rate, regular rhythm, normal heart sounds and intact distal pulses  Pulmonary/Chest: Effort normal and breath sounds normal    Abdominal: Soft  Bowel sounds are normal    Musculoskeletal: Normal range of motion  Neurological: She is alert and oriented to person, place, and time  Skin: Skin is warm and dry  She is not diaphoretic  Psychiatric: She has a normal mood and affect  Nursing note and vitals reviewed  Vitals: Blood pressure 96/62, pulse 68, height 5' 2" (1 575 m), SpO2 96 %  Wt Readings from Last 3 Encounters:   04/05/21 95 kg (209 lb 7 oz)   03/23/21 97 1 kg (214 lb 1 1 oz)         Labs & Results:  Lab Results   Component Value Date    WBC 8 74 04/04/2021    HGB 11 8 04/04/2021    HCT 37 8 04/04/2021    MCV 92 04/04/2021     04/04/2021     No results found for: BNP  No components found for: CHEM  Troponin I   Date Value Ref Range Status   03/23/2021 >40 00 (H) <=0 04 ng/mL Final     Comment:     Autovalidation override  Siemens Chemistry analyzer 99% cutoff is > 0 04 ng/mL in network labs     o cTnI 99% cutoff is useful only when applied to patients in the clinical setting of myocardial ischemia   o cTnI 99% cutoff should be interpreted in the context of clinical history, ECG findings and possibly cardiac imaging to establish correct diagnosis  o cTnI 99% cutoff may be suggestive but clearly not indicative of a coronary event without the clinical setting of myocardial ischemia      03/23/2021 >40 00 (H) <=0 04 ng/mL Final     Comment:     Autovalidation override  Siemens Chemistry analyzer 99% cutoff is > 0 04 ng/mL in network labs     o cTnI 99% cutoff is useful only when applied to patients in the clinical setting of myocardial ischemia   o cTnI 99% cutoff should be interpreted in the context of clinical history, ECG findings and possibly cardiac imaging to establish correct diagnosis     o cTnI 99% cutoff may be suggestive but clearly not indicative of a coronary event without the clinical setting of myocardial ischemia      2021 >40 00 (H) <=0 04 ng/mL Final     Comment:     Autovalidation override  Siemens Chemistry analyzer 99% cutoff is > 0 04 ng/mL in network labs     o cTnI 99% cutoff is useful only when applied to patients in the clinical setting of myocardial ischemia   o cTnI 99% cutoff should be interpreted in the context of clinical history, ECG findings and possibly cardiac imaging to establish correct diagnosis  o cTnI 99% cutoff may be suggestive but clearly not indicative of a coronary event without the clinical setting of myocardial ischemia  Results for orders placed during the hospital encounter of 21   Echo complete with contrast if indicated    Narrative [place] [address], [address] [facility]  [phone]    Transthoracic Echocardiogram  2D, M-mode, Doppler, and Color Doppler    Study date:  23-Mar-2021    Patient: Noe Sullivan  MR number: [id number]  Account number: [de-identified]  : 1945  Age: 70 years  Gender: Female  Status: Inpatient  Location: Bedside  Height: 62 in  Weight: 213 6 lb  BP: 167/ 81 mmHg    Indications: Non-ST elevation MI, SVT  Diagnoses: I21 4 - Non-ST elevation (NSTEMI) myocardial infarction    Sonographer:  RADHA Pedersen  Primary Physician:  Ron Cason DO  Referring Physician:  SHMUEL Naranjo  Group:  [facility]  Interpreting Physician:  Lakesha Bhatt MD    SUMMARY    PROCEDURE INFORMATION:  This was a technically difficult study  LEFT VENTRICLE:  Size was normal   Systolic function was at the lower limits of normal  Ejection fraction was estimated to be 50 %  There was severe hypokinesis of the basal-mid inferior and basal-mid inferolateral wall(s)  Doppler parameters were consistent with abnormal left ventricular relaxation (grade 1 diastolic dysfunction)      MITRAL VALVE:  There was mild annular calcification  There was mild regurgitation  AORTIC VALVE:  The valve was trileaflet  Leaflets exhibited mild calcification, lower normal cuspal separation, and sclerosis  The peak valve velocity was 179 cm/s  Valve mean gradient was 6 mmHg  TRICUSPID VALVE:  There was trace regurgitation  HISTORY: PRIOR HISTORY: HTN, DM, GERD  PROCEDURE: The procedure was performed at the bedside  This was a routine study  The transthoracic approach was used  The study included complete 2D imaging, M-mode, complete spectral Doppler, and color Doppler  The heart rate was 71 bpm,  at the start of the study  Images were obtained from the parasternal, apical, subcostal, and suprasternal notch acoustic windows  Echocardiographic views were limited due to poor acoustic window availability, decreased penetration, and  lung interference  This was a technically difficult study  LEFT VENTRICLE: Size was normal  Systolic function was at the lower limits of normal  Ejection fraction was estimated to be 50 %  There was severe hypokinesis of the basal-mid inferior and basal-mid inferolateral wall(s)  Wall thickness  was normal  DOPPLER: Doppler parameters were consistent with abnormal left ventricular relaxation (grade 1 diastolic dysfunction)  RIGHT VENTRICLE: The size was normal  Systolic function was normal  Wall thickness was normal     LEFT ATRIUM: Size was normal     RIGHT ATRIUM: Size was normal     MITRAL VALVE: There was mild annular calcification  Valve structure was normal  There was normal leaflet separation  DOPPLER: The transmitral velocity was within the normal range  There was no evidence for stenosis  There was mild  regurgitation  AORTIC VALVE: The valve was trileaflet  Leaflets exhibited mild calcification, lower normal cuspal separation, and sclerosis  DOPPLER: Transaortic velocity was minimally increased  There was no evidence for stenosis   There was no  significant regurgitation  TRICUSPID VALVE: The valve structure was normal  There was normal leaflet separation  DOPPLER: The transtricuspid velocity was within the normal range  There was no evidence for stenosis  There was trace regurgitation  PULMONIC VALVE: Leaflets exhibited normal thickness, no calcification, and normal cuspal separation  DOPPLER: The transpulmonic velocity was within the normal range  There was no significant regurgitation  PERICARDIUM: There was no pericardial effusion  The pericardium was normal in appearance  AORTA: The root exhibited normal size  SYSTEMIC VEINS: IVC: The inferior vena cava was normal in size  Respirophasic changes were normal     MEASUREMENT TABLES    DOPPLER MEASUREMENTS  Aortic valve   (Reference normals)  Peak stefan   179 cm/s   (--)  Mean stefan   114 cm/s   (--)  VTI   35 cm   (--)  Peak gradient   19 mmHg   (--)  Mean gradient   6 mmHg   (--)    SYSTEM MEASUREMENT TABLES    CW  AV Env  Ti: 304 47 ms  AV MaxP 76 mmHg  AV VTI: 34 61 cm  AV Vmax: 1 79 m/s  AV Vmean: 1 14 m/s  AV meanP 92 mmHg    MM  TAPSE: 2 09 cm    PW  E' Sept: 0 05 m/s  E/E' Sept: 14 2  LVOT Env  Ti: 308 28 ms  LVOT VTI: 17 88 cm  LVOT Vmax: 0 93 m/s  LVOT Vmean: 0 58 m/s  LVOT maxPG: 3 47 mmHg  LVOT meanP 59 mmHg  MV A Stefan: 0 82 m/s  MV Dec [place]: 2 57 m/s2  MV DecT: 252 83 ms  MV E Stefan: 0 65 m/s  MV E/A Ratio: 0 81  MV PHT: 73 32 ms  MVA By PHT: 3 cm2    [address] Accredited Echocardiography Laboratory    Prepared and electronically signed by    Catarina Salgado MD  Signed 23-Mar-2021 15:10:29       No results found for this or any previous visit  This note was completed in part utilizing [employer] direct voice recognition software  Grammatical errors, random word insertion, spelling mistakes, and incomplete sentences may be an occasional consequence of the system secondary to software limitations, ambient noise and hardware issues   At the time of dictation, efforts were made to edit, clarify and /or correct errors  Please read the chart carefully and recognize, using context, where substitutions have occurred    If you have any questions or concerns about the context, text or information contained within the body of this dictation, please contact myself, the provider, for further clarification

## 2021-04-09 ENCOUNTER — DOCUMENTATION (OUTPATIENT)
Dept: SOCIAL WORK | Facility: HOSPITAL | Age: 76
End: 2021-04-09

## 2021-04-09 NOTE — PROGRESS NOTES
Admission Report at [facility] has admitted your patient to [address] [place] [address] service with the following disciplines:      SN and PT  This report is informational only, no responses is needed  Primary focus of home health care CP assessment Incision checks  Patient stated goals of care To not feel so tired  Anticipated visit pattern and next visit date Next visit 4 9 21 visit pattern 2 weekly x 3 weeks 1 weekly x 5 weeks  Significant clinical findings NA  Request for additional disciplines NA  Request for medication clarification NA  Request for other order clarification NA  Needs follow up physician appointments scheduled Saw PCP on 4 6 Cardiology on 4 8  Potential barriers to goal achievement Pt has very little motivation   Other pertinent information NA    Thank you for allowing us to participate in the care of your patient        Ricardo Figueroa RN

## 2021-04-10 ENCOUNTER — TELEPHONE (OUTPATIENT)
Dept: OTHER | Facility: OTHER | Age: 76
End: 2021-04-10

## 2021-04-10 NOTE — TELEPHONE ENCOUNTER
Pt daughter called says mom had open heart surgery and her incisions look red and infected, at the bottom half where the moisture is

## 2021-04-10 NOTE — TELEPHONE ENCOUNTER
Phone: [phone] [place] daughter of Pt: Cooper Jones : [phone] Reason: Patient had Surgery with Dr Anya Brar on 2021 for a CABG  The Patient is now experiencing Incision Redness between Breast and Patient is Cold and very tired and Sleepy   Would like the on call Provider to call her back

## 2021-04-12 ENCOUNTER — TELEPHONE (OUTPATIENT)
Dept: CARDIAC SURGERY | Facility: CLINIC | Age: 76
End: 2021-04-12

## 2021-04-12 NOTE — TELEPHONE ENCOUNTER
Per Dr Bernard Tafoya- s/p CABG 3/31 with RF, discharged Monday  Daughter is reporting erythema of incision  No drainage or sensation of sternal motion per report  Also has general fatigue and some chills, no fever  Advised her to get checked in the ER  Daughter stated she will take her if it persists or there are any changes  If she doesn't come to the ED, she will need a wound check on Monday  I will call her to check in on her again - Patient or patient's daughter will call our office if she would like to come in for a wound check today or if she would like to speak with a physician at this time

## 2021-04-12 NOTE — TELEPHONE ENCOUNTER
Follow up call    3/23/21: Admitted w/ CP  Cath with MVCAD   3/31/21: CABG x 4  4/5/21: Discharge home  4/10/21: Daughter called answering service regarding chest incision, addressed by Dr Jaqui Lee  4/12/21: Spoke with patient and with daughter  Chest incision is dry, no redness or drainage  Patient nauseated with frequent stools  She states she does not tolerate Metformin (currently on 850 mg TID)  Seen by PCP but not changed  Current weight 199 lb (pre op 210 lb)  No edema  No eating much  Currently on Torsemide 20 mg & Potassium 20 mEq daily  Advised to stop Diuretic and Potassium now  Advised to speak with PCP regarding the Metformin as it is likely causing her frequent stools  Advised to eat bland diet, frequent small measl, such as oatmeal or cream of wheat, yogurt, toast etc   Advised to call with incisonal issues and we will see her in the office for wound check

## 2021-04-27 ENCOUNTER — OFFICE VISIT (OUTPATIENT)
Dept: CARDIAC SURGERY | Facility: CLINIC | Age: 76
End: 2021-04-27

## 2021-04-27 VITALS
SYSTOLIC BLOOD PRESSURE: 170 MMHG | HEART RATE: 77 BPM | BODY MASS INDEX: 37.67 KG/M2 | DIASTOLIC BLOOD PRESSURE: 94 MMHG | TEMPERATURE: 97.3 F | WEIGHT: 204.7 LBS | HEIGHT: 62 IN | RESPIRATION RATE: 18 BRPM | OXYGEN SATURATION: 97 %

## 2021-04-27 DIAGNOSIS — I25.10 CORONARY ARTERY DISEASE, UNSPECIFIED VESSEL OR LESION TYPE, UNSPECIFIED WHETHER ANGINA PRESENT, UNSPECIFIED WHETHER NATIVE OR TRANSPLANTED HEART: Primary | ICD-10-CM

## 2021-04-27 PROCEDURE — 99024 POSTOP FOLLOW-UP VISIT: CPT | Performed by: PHYSICIAN ASSISTANT

## 2021-04-27 RX ORDER — LINAGLIPTIN 5 MG/1
5 TABLET, FILM COATED ORAL DAILY
COMMUNITY
Start: 2021-04-24

## 2021-04-27 NOTE — PROGRESS NOTES
Procedure: S/P coronary artery bypass grafting x 4, performed on 3/31/2021    History: Anatoly Muro is a 78y o  year old female who presents to our office today for routine follow up care from coronary artery bypass grafting  Patient is doing well without complaints  Denies CP, SOB, worsening LE edema, numbness/paraesthesias, syncope, popping/clicking of sternum  She lives with daughter who cares for her incision, cleaning daily  Daughter states she had mild oozing drainage at top of chest incision which has healed, now with lower chest incision oozing  Patient states she does not wear a bra  She is staying active, walking daily, abiding by weight lifting restrictions, and weighing herself daily  Vital Signs:   Vitals:    04/27/21 1523 04/27/21 1528   BP: (!) 182/90 170/94   BP Location: Left arm Right arm   Patient Position: Sitting Sitting   Cuff Size: Standard    Pulse: 77    Resp: 18    Temp: (!) 97 3 °F (36 3 °C)    TempSrc: Tympanic    SpO2: 97%    Weight: 92 9 kg (204 lb 11 2 oz)    Height: 5' 2" (1 575 m)        Home Medications:   Prior to Admission medications    Medication Sig Start Date End Date Taking?  Authorizing Provider   acetaminophen (TYLENOL) 325 mg tablet Take 2 tablets (650 mg total) by mouth every 6 (six) hours as needed for mild pain or moderate pain 4/5/21   Cynthia Medina PA-C   aspirin 325 mg tablet Take 1 tablet (325 mg total) by mouth daily 4/5/21   Judd Medina PA-C   atorvastatin (LIPITOR) 80 mg tablet Take 80 mg by mouth daily    Historical Provider, MD   docusate sodium (COLACE) 100 mg capsule Take 1 capsule (100 mg total) by mouth 2 (two) times a day  Patient not taking: Reported on 4/8/2021 4/5/21   Judd Medina PA-C   lisinopril (ZESTRIL) 20 mg tablet Take 0 5 tablets (10 mg total) by mouth daily 4/8/21   SHMUEL Gee   metFORMIN (GLUCOPHAGE) 850 mg tablet Take 850 mg by mouth Three times a day    Historical Provider, MD   metoprolol tartrate (LOPRESSOR) 100 mg tablet Take 1 tablet (100 mg total) by mouth every 12 (twelve) hours 4/5/21   Judd Median PA-C   Omeprazole 20 MG TBEC Take 20 mg by mouth daily    Historical Provider, MD   polyethylene glycol (MIRALAX) 17 g packet Take 17 g by mouth daily  Patient not taking: Reported on 4/8/2021 4/5/21   Judd Medina PA-C   potassium chloride (K-DUR,KLOR-CON) 20 mEq tablet Take one tablet ( 20 meq) daily until 4/19 then stop 4/8/21   SHMUEL Lewis   torsemide [facility]) 20 mg tablet One tab daily until 4/19 then stop 4/8/21   SHMUEL Lewis       Physical Exam:    HEENT/NECK:  Normocephalic  Atraumatic  No jugular venous distention  Cardiac: Regular rate and rhythm and No murmurs/rubs/gallops  Pulmonary:  Breath sounds clear bilaterally and No rales/rhonchi/wheezes  Abdomen:  Non-tender, Non-distended and Normal bowel sounds  Incisions: Sternum is stable  Incision is clean, dry, and intact  Minimal serous drainage at distal chest incision  Extremities: Extremities warm/dry and 2+ edema B/L  Neuro: Alert and oriented X 3  Sensation is grossly intact  No focal deficits  Skin: Warm/Dry, without rashes or lesions  Lab Results:               Invalid input(s): LABGLOM      Lab Results   Component Value Date    HGBA1C 6 4 (H) 03/23/2021     Lab Results   Component Value Date    TROPONINI >40 00 (H) 03/23/2021       Assessment: Coronary artery disease  S/P coronary artery bypass grafting;    Plan:     Chaka Chacko continues to recover well following coronary artery bypass grafting  To date they have made progressive improvements physical rehabilitation  At this point I have cleared them to begin outpatient cardiac rehabilitation and have encouraged them to to do so  Chaka Chacko has also been cleared to resume driving at this point  I asked that them do so in progressive increments  I did remind them of their ongoing lifting restrictions of 25 pounds for an additional 2 months      Elisha Grant Dionicio Ascencio has already been evaluated by their primary care physician cardiologist for ongoing medical care  At this point I have not scheduled them for routine followup care with our office  I have advised them to call with any new concerns that may arise  Izabela Amador was comfortable with our recommendations and their questions were answered to their satisfaction  Laina Barreto PA-C  04/27/21    * This note was completed in part utilizing [employer] direct voice recognition software  Grammatical errors, random word insertion, spelling mistakes, and incomplete sentences may be an occasional consequence of the system secondary to software limitations, ambient noise and hardware issues  At the time of dictation, efforts were made to edit, clarify and /or correct errors  Please read the chart carefully and recognize, using context, where substitutions have occurred  If you have any questions or concerns about the context, text or information contained within the body of this dictation, please contact myself, the provider, for further clarification

## 2021-05-25 ENCOUNTER — TELEPHONE (OUTPATIENT)
Dept: OTHER | Facility: OTHER | Age: 76
End: 2021-05-25

## 2021-06-08 ENCOUNTER — OFFICE VISIT (OUTPATIENT)
Dept: CARDIOLOGY CLINIC | Facility: CLINIC | Age: 76
End: 2021-06-08

## 2021-06-08 VITALS
DIASTOLIC BLOOD PRESSURE: 100 MMHG | SYSTOLIC BLOOD PRESSURE: 170 MMHG | BODY MASS INDEX: 38.46 KG/M2 | HEART RATE: 78 BPM | OXYGEN SATURATION: 97 % | HEIGHT: 62 IN | WEIGHT: 209 LBS

## 2021-06-08 DIAGNOSIS — I25.119 CORONARY ARTERY DISEASE INVOLVING NATIVE CORONARY ARTERY OF NATIVE HEART WITH ANGINA PECTORIS (HCC): ICD-10-CM

## 2021-06-08 DIAGNOSIS — I47.1 SVT (SUPRAVENTRICULAR TACHYCARDIA) (HCC): Primary | ICD-10-CM

## 2021-06-08 DIAGNOSIS — Z95.1 S/P CABG (CORONARY ARTERY BYPASS GRAFT): ICD-10-CM

## 2021-06-08 DIAGNOSIS — I21.4 NSTEMI (NON-ST ELEVATED MYOCARDIAL INFARCTION) (HCC): ICD-10-CM

## 2021-06-08 PROCEDURE — 99214 OFFICE O/P EST MOD 30 MIN: CPT | Performed by: INTERNAL MEDICINE

## 2021-06-08 RX ORDER — LISINOPRIL 10 MG/1
10 TABLET ORAL DAILY
Start: 2021-06-08 | End: 2021-09-15 | Stop reason: SDUPTHER

## 2021-06-08 RX ORDER — METOPROLOL TARTRATE 100 MG/1
100 TABLET ORAL EVERY 12 HOURS SCHEDULED
Qty: 180 TABLET | Refills: 3 | Status: SHIPPED | OUTPATIENT
Start: 2021-06-08 | End: 2022-06-21

## 2021-06-08 RX ORDER — BLOOD SUGAR DIAGNOSTIC
STRIP MISCELLANEOUS 2 TIMES DAILY
COMMUNITY
Start: 2021-04-23

## 2021-06-08 RX ORDER — MULTIVIT-MIN/FERROUS FUMARATE 9 MG/15 ML
LIQUID (ML) ORAL
COMMUNITY

## 2021-06-08 RX ORDER — LISINOPRIL 20 MG/1
20 TABLET ORAL DAILY
Qty: 90 TABLET | Refills: 3 | Status: CANCELLED
Start: 2021-06-08

## 2021-06-08 RX ORDER — ASPIRIN 325 MG
325 TABLET ORAL DAILY
Qty: 90 TABLET | Refills: 3 | Status: SHIPPED | OUTPATIENT
Start: 2021-06-08

## 2021-06-08 NOTE — PROGRESS NOTES
Cardiology Follow Up    Paul Torres  1945  [id number]  [facility]  [address]  [address]  [facility] [id number] [phone]    1  SVT (supraventricular tachycardia) ([facility] )     2  NSTEMI (non-ST elevated myocardial infarction) ([facility])  lisinopril (ZESTRIL) 20 mg tablet   3  Coronary artery disease involving native coronary artery of native heart with angina pectoris ([facility])  lisinopril (ZESTRIL) 20 mg tablet   4  S/P CABG (coronary artery bypass graft)  lisinopril (ZESTRIL) 20 mg tablet     Chief Complaint   Patient presents with    Shortness of Breath     when walking far     Follow-up     s/p cabg 4/2021        Interval History: Patient has some mild shortness of breath with walking far, but otherwise no complaints  No reported chest pain, palpitations, lightheadedness, syncope, LE edema, orthopnea, PND, or significant weight changes  Patient remains active without any increased fatigue out of the ordinary          Patient Active Problem List   Diagnosis    NSTEMI (non-ST elevated myocardial infarction) ([facility] )    Hypertensive urgency    Diabetes mellitus ([facility] )    GERD (gastroesophageal reflux disease)    SVT (supraventricular tachycardia) ([facility] )    Coronary artery disease involving native coronary artery of native heart with angina pectoris ([facility] )    Bilateral carotid artery stenosis    History of COVID-19    S/P CABG (coronary artery bypass graft)    Postoperative anemia due to acute blood loss    Thrombocytopenia ([facility])    Anemia    Hyperchloremia     Past Medical History:   Diagnosis Date    Diabetes mellitus ([facility] )     Hyperlipidemia     Hypertension     Paroxysmal SVT (supraventricular tachycardia) ([facility])      Social History     Socioeconomic History    Marital status: /Civil Union     Spouse name: Not on file    Number of children: Not on file    Years of education: Not on file    Highest education level: Not on file   Occupational History    Not on file   Social Needs    Financial resource strain: Not on file    Food insecurity     Worry: Not on file     Inability: Not on file    Transportation needs     Medical: Not on file     Non-medical: Not on file   Tobacco Use    Smoking status: Former Smoker     Packs/day: 1 00     Years: 30 00     Pack years: 30 00     Types: Cigarettes     Quit date:      Years since quittin 4    Smokeless tobacco: Never Used   Substance and Sexual Activity    Alcohol use: Not Currently    Drug use: Not Currently    Sexual activity: Not on file   Lifestyle    Physical activity     Days per week: Not on file     Minutes per session: Not on file    Stress: Not on file   Relationships    Social connections     Talks on phone: Not on file     Gets together: Not on file     Attends Judaism service: Not on file     Active member of club or organization: Not on file     Attends meetings of clubs or organizations: Not on file     Relationship status: Not on file    Intimate partner violence     Fear of current or ex partner: Not on file     Emotionally abused: Not on file     Physically abused: Not on file     Forced sexual activity: Not on file   Other Topics Concern    Not on file   Social History Narrative    Not on file      Family History   Problem Relation Age of Onset    Heart attack Sister     Breast cancer Sister     Heart attack Brother      Past Surgical History:   Procedure Laterality Date    CORONARY ARTERY BYPASS GRAFT N/A 3/31/2021    Procedure: CORONARY ARTERY BYPASS GRAFT (CABG) 4 VESSELS, GSV-->POSTERIOR LATERAL, GSV-->PDA, GSV-->DIAG, LIMA-->LAD   EVH LEFT LEG;  Surgeon: Becky Dixon MD;  [facility]: [facility];  Service: Cardiac Surgery    FOREARM FRACTURE SURGERY Left        Current Outpatient Medications:     aspirin 325 mg tablet, Take 1 tablet (325 mg total) by mouth daily, Disp: 30 tablet, Rfl: 2    atorvastatin (LIPITOR) 80 mg tablet, Take 80 mg by mouth daily, Disp: , Rfl:     Cholecalciferol (Vitamin D3) 30 MCG/15ML LIQD, Take by mouth, Disp: , Rfl:     docusate sodium (COLACE) 100 mg capsule, Take 1 capsule (100 mg total) by mouth 2 (two) times a day, Disp: 10 capsule, Rfl: 0    lisinopril (ZESTRIL) 20 mg tablet, Take 0 5 tablets (10 mg total) by mouth daily, Disp: 30 tablet, Rfl: 2    metoprolol tartrate (LOPRESSOR) 100 mg tablet, Take 1 tablet (100 mg total) by mouth every 12 (twelve) hours, Disp: 60 tablet, Rfl: 2    Omeprazole 20 MG TBEC, Take 20 mg by mouth daily, Disp: , Rfl:     OneTouch Ultra test strip, 2 (two) times a day Test, Disp: , Rfl:     Tradjenta 5 MG TABS, Take 5 mg by mouth daily, Disp: , Rfl:     acetaminophen (TYLENOL) 325 mg tablet, Take 2 tablets (650 mg total) by mouth every 6 (six) hours as needed for mild pain or moderate pain (Patient not taking: Reported on 6/8/2021), Disp: 30 tablet, Rfl: 0    metFORMIN (GLUCOPHAGE) 850 mg tablet, Take 850 mg by mouth Three times a day, Disp: , Rfl:     polyethylene glycol (MIRALAX) 17 g packet, Take 17 g by mouth daily (Patient not taking: Reported on 4/8/2021), Disp: 10 each, Rfl: 0    potassium chloride (K-DUR,KLOR-CON) 20 mEq tablet, Take one tablet ( 20 meq) daily until 4/19 then stop (Patient not taking: Reported on 6/8/2021), Disp: , Rfl:     torsemide (DEMADEX) 20 mg tablet, One tab daily until 4/19 then stop (Patient not taking: Reported on 6/8/2021), Disp: , Rfl:   No Known Allergies    Labs:  No results displayed because visit has over 200 results        Admission on 03/22/2021, Discharged on 03/23/2021   Component Date Value    WBC 03/22/2021 9 91     RBC 03/22/2021 4 64     Hemoglobin 03/22/2021 13 4     Hematocrit 03/22/2021 42 2     MCV 03/22/2021 91     MCH 03/22/2021 28 9     MCHC 03/22/2021 31 8     RDW 03/22/2021 15 0     MPV 03/22/2021 10 1     Platelets 05/08/0993 216     nRBC 03/22/2021 0     Neutrophils Relative 03/22/2021 74     Immat GRANS % 03/22/2021 1     Lymphocytes Relative 03/22/2021 20     Monocytes Relative 03/22/2021 5     Eosinophils Relative 03/22/2021 0     Basophils Relative 03/22/2021 0     Neutrophils Absolute 03/22/2021 7 34     Immature Grans Absolute 03/22/2021 0 06     Lymphocytes Absolute 03/22/2021 1 99     Monocytes Absolute 03/22/2021 0 47     Eosinophils Absolute 03/22/2021 0 03     Basophils Absolute 03/22/2021 0 02     Sodium 03/22/2021 140     Potassium 03/22/2021 4 7     Chloride 03/22/2021 100     CO2 03/22/2021 28     ANION GAP 03/22/2021 12     BUN 03/22/2021 14     Creatinine 03/22/2021 0 97     Glucose 03/22/2021 124     Calcium 03/22/2021 8 9     AST 03/22/2021 289*    ALT 03/22/2021 48     Alkaline Phosphatase 03/22/2021 63     Total Protein 03/22/2021 8 0     Albumin 03/22/2021 3 8     Total Bilirubin 03/22/2021 0 40     eGFR 03/22/2021 58     Troponin I 03/22/2021 >40 00*    PTT 03/22/2021 28     Protime 03/22/2021 12 8     INR 03/22/2021 0 95     Troponin I 03/22/2021 >40 00*    Sodium 03/22/2021 138     Potassium 03/22/2021 4 3     Chloride 03/22/2021 104     CO2 03/22/2021 24     ANION GAP 03/22/2021 10     BUN 03/22/2021 13     Creatinine 03/22/2021 0 92     Glucose 03/22/2021 127     Calcium 03/22/2021 8 8     AST 03/22/2021 340*    ALT 03/22/2021 48     Alkaline Phosphatase 03/22/2021 55     Total Protein 03/22/2021 7 2     Albumin 03/22/2021 3 5     Total Bilirubin 03/22/2021 0 41     eGFR 03/22/2021 62     Troponin I 03/23/2021 >40 00*    Troponin I 03/23/2021 >40 00*    Magnesium 03/23/2021 0 9*    PTT 03/23/2021 74*    POC Glucose 03/23/2021 150*    NT-proBNP 03/23/2021 5,102*    Magnesium 03/23/2021 1 6     Phosphorus 03/23/2021 4 1     Cholesterol 03/23/2021 195     Triglycerides 03/23/2021 169*    HDL, Direct 03/23/2021 51     LDL Calculated 03/23/2021 110*    Non-HDL-Chol (CHOL-HDL) 03/23/2021 144     Hemoglobin A1C 03/23/2021 6 4*    EAG 03/23/2021 137     Sodium 03/23/2021 138     Potassium 03/23/2021 4 0     Chloride 03/23/2021 100     CO2 03/23/2021 25     ANION GAP 03/23/2021 13     BUN 03/23/2021 12     Creatinine 03/23/2021 0 89     Glucose 03/23/2021 139     Calcium 03/23/2021 8 2*    Corrected Calcium 03/23/2021 8 7     AST 03/23/2021 444*    ALT 03/23/2021 60     Alkaline Phosphatase 03/23/2021 55     Total Protein 03/23/2021 7 1     Albumin 03/23/2021 3 4*    Total Bilirubin 03/23/2021 0 54     eGFR 03/23/2021 65     WBC 03/23/2021 10 94*    RBC 03/23/2021 4 22     Hemoglobin 03/23/2021 12 3     Hematocrit 03/23/2021 37 5     MCV 03/23/2021 89     MCH 03/23/2021 29 1     MCHC 03/23/2021 32 8     RDW 03/23/2021 15 1     Platelets 69/80/5417 195     MPV 03/23/2021 10 6     Troponin I 03/23/2021 >40 00*    POC Glucose 03/23/2021 142*    PTT 03/23/2021 71*    TSH 3RD GENERATON 03/23/2021 1 563     Ventricular Rate 03/23/2021 61     Atrial Rate 03/23/2021 61     WV Interval 03/23/2021 142     QRSD Interval 03/23/2021 86     QT Interval 03/23/2021 526     QTC Interval 03/23/2021 529     P Axis 03/23/2021 123     QRS Axis 03/23/2021 196     T Wave Axis 03/23/2021 136     Ventricular Rate 03/23/2021 67     Atrial Rate 03/23/2021 67     WV Interval 03/23/2021 134     QRSD Interval 03/23/2021 86     QT Interval 03/23/2021 500     QTC Interval 03/23/2021 528     P Axis 03/23/2021 138     QRS Axis 03/23/2021 202     T Wave Axis 03/23/2021 145     Ventricular Rate 03/23/2021 73     Atrial Rate 03/23/2021 73     WV Interval 03/23/2021 156     QRSD Interval 03/23/2021 86     QT Interval 03/23/2021 468     QTC Interval 03/23/2021 515     P Axis 03/23/2021 60     QRS Axis 03/23/2021 -28     T Wave Axis 03/23/2021 41     Ventricular Rate 03/23/2021 76     Atrial Rate 03/23/2021 76     WV Interval 03/23/2021 154     QRSD Interval 03/23/2021 84     QT Interval 03/23/2021 486     QTC Interval 03/23/2021 546     P Axis 03/23/2021 72     QRS Axis 03/23/2021 -24     T Wave Axis 03/23/2021 61     POC Glucose 03/23/2021 137     POC Glucose 03/23/2021 122     POC Glucose 03/23/2021 151*    Ventricular Rate 03/22/2021 85     Atrial Rate 03/22/2021 85     ND Interval 03/22/2021 88     QRSD Interval 03/22/2021 88     QT Interval 03/22/2021 366     QTC Interval 03/22/2021 435     P Axis 03/22/2021 62     QRS Axis 03/22/2021 -24     T Wave Axis 03/22/2021 105     Ventricular Rate 03/23/2021 138     Atrial Rate 03/23/2021 63     QRSD Interval 03/23/2021 88     QT Interval 03/23/2021 316     QTC Interval 03/23/2021 478     QRS Axis 03/23/2021 -27     T Wave Axis 03/23/2021 240      Lab Results   Component Value Date    TRIG 169 (H) 03/23/2021    HDL 51 03/23/2021     Imaging: No results found  Review of Systems:  Review of Systems   Constitutional: Negative for activity change, appetite change, chills, diaphoresis, fatigue and unexpected weight change  HENT: Negative for hearing loss, nosebleeds and sore throat  Eyes: Negative for photophobia and visual disturbance  Respiratory: Positive for shortness of breath  Negative for cough, chest tightness and wheezing  Cardiovascular: Negative for chest pain, palpitations and leg swelling  Gastrointestinal: Negative for abdominal pain, diarrhea, nausea and vomiting  Endocrine: Negative for polyuria  Genitourinary: Negative for dysuria, frequency and hematuria  Musculoskeletal: Negative for arthralgias, back pain, gait problem and neck pain  Skin: Negative for pallor and rash  Neurological: Negative for dizziness, syncope and headaches  Hematological: Does not bruise/bleed easily  Psychiatric/Behavioral: Negative for behavioral problems and confusion  Physical Exam:  Physical Exam  Vitals signs reviewed  Constitutional:       Appearance: She is well-developed   She is not diaphoretic  HENT:      Head: Normocephalic and atraumatic  Nose: Nose normal    Eyes:      General: No scleral icterus  Pupils: Pupils are equal, round, and reactive to light  Neck:      Musculoskeletal: Normal range of motion and neck supple  Vascular: No JVD  Cardiovascular:      Rate and Rhythm: Normal rate and regular rhythm  Heart sounds: Normal heart sounds  No murmur  No friction rub  No gallop  Pulmonary:      Effort: Pulmonary effort is normal  No respiratory distress  Breath sounds: Normal breath sounds  No wheezing or rales  Abdominal:      General: Bowel sounds are normal  There is no distension  Palpations: Abdomen is soft  Tenderness: There is no abdominal tenderness  Musculoskeletal: Normal range of motion  General: No deformity  Skin:     General: Skin is warm and dry  Findings: No rash  Neurological:      Mental Status: She is alert and oriented to person, place, and time  Cranial Nerves: No cranial nerve deficit  Psychiatric:         Behavior: Behavior normal        Blood pressure 170/100, pulse 78, height 5' 2" (1 575 m), weight 94 8 kg (209 lb), SpO2 97 %  Discussion/Summary:  Late presentation NSTEMI with MV-CAD: s/p CABGx4  Doing well and continued on ASA, statin, B-blocker  HTN: very elevated today  Was low at prior visit and lisinopril was decreased  At home, more of BPs are in 130's range  HLD: continued on high dose Lipitor   in March 2021  PSVT: continued on B-blocker, no recurrence based on symptoms recently

## 2021-06-08 NOTE — PATIENT INSTRUCTIONS
Coronary Artery Disease   AMBULATORY CARE:   Coronary artery disease (CAD)  is narrowing of the arteries to your heart caused by a buildup of plaque  Plaque is made up of cholesterol and other substances  The narrowing in your arteries decreases the amount of blood that can flow to your heart  This causes your heart to get less oxygen  You may not have any symptoms of CAD  It is important for you to manage CAD even if you feel well  CAD can lead to a heart attack if it is not managed  Common symptoms include the following:   · Chest pain (angina), causing burning, squeezing, or crushing tightness in your chest    · Pain that spreads to your neck, jaw, or shoulder blade    · Nausea, vomiting, sweating, fainting, and hands and feet that are cold to the touch    Call 911 for any of the following:   · You have any of the following signs of a heart attack:      ? Squeezing, pressure, or pain in your chest    ? You may  also have any of the following:     § Discomfort or pain in your back, neck, jaw, stomach, or arm    § Shortness of breath    § Nausea or vomiting    § Lightheadedness or a sudden cold sweat      Contact your healthcare provider if:   · You have chest pain that is more frequent, or you have chest pain at rest     · You have questions or concerns about your condition or care  Medicines used to treat CAD:   · Blood pressure medicines  are given to lower your blood pressure  ACE inhibitors help keep your blood vessels relaxed and open to help keep blood flowing into your heart  Beta-blockers keep your heart pumping strongly and regularly so it does not work too hard to get oxygen  · Cholesterol medicines  help lower blood cholesterol levels  · Nitrates , such as nitroglycerin, relax the arteries of your heart so it gets more oxygen  They help to relieve your chest pain  · Antiplatelets , such as aspirin, help prevent blood clots  Take your antiplatelet medicine exactly as directed   These medicines make it more likely for you to bleed or bruise  If you are told to take aspirin, do not take acetaminophen or ibuprofen instead  · Blood thinners  keep clots from forming in your blood  Clots may cause heart attacks, strokes, or death  This medicine makes it more likely for you to bleed or bruise  · Do not take certain medicines without asking your healthcare provider first   These include NSAIDs, herbal or vitamin supplements, or hormones (estrogen or progestin)  Procedures used to treat CAD:   · Angioplasty  may be done to open an artery blocked by plaque  A tube with a balloon on the end is threaded into the blocked artery  Once the tube is in the artery, the balloon is inflated  As the balloon inflates, it presses the plaque against the artery wall to open the artery  A stent may be placed in your artery to keep it open  · Coronary artery bypass graft surgery (CABG)  is open heart surgery  Healthcare providers take arteries or veins from other areas in your body and use them to bypass or go around the blocked arteries of your heart  Cardiac rehabilitation:  Your healthcare provider may recommend that you attend cardiac rehabilitation (rehab)  This is a program run by specialists who will help you safely strengthen your heart and reduce the risk for more heart disease  The plan includes exercise, relaxation, stress management, and heart-healthy nutrition  Healthcare providers will also check to make sure any medicines you are taking are working  Manage CAD to prevent a heart attack:   · Do not smoke  Nicotine and other chemicals in cigarettes and cigars can cause heart and lung damage  Ask your healthcare provider for information if you currently smoke and need help to quit  E-cigarettes or smokeless tobacco still contain nicotine  Talk to your healthcare provider before you use these products  · Exercise regularly    Exercise at least 30 minutes each day, on most days of the week  Exercise helps to lower high cholesterol and high blood pressure  It can also help you maintain a healthy weight  Ask your healthcare provider about the kind of exercise you should do and how to get started  · Maintain a healthy weight  If you are overweight, talk to your healthcare provider about how to lose weight  A weight loss of 10% can improve your heart health  · Eat heart-healthy foods  Include fresh fruits and vegetables in your meal plan  Choose low-fat foods, such as skim or 1% fat milk, low-fat cheese and yogurt, fish, chicken (without skin), and lean meats  Eat two 4-ounce servings of fish high in omega-3 fats each week, such as salmon, fresh tuna, and herring  Do not eat foods that are high in sodium, such as canned foods, potato chips, salty snacks, and cold cuts  Put less table salt on your food  · Limit or do not drink alcohol  A drink of alcohol is 12 ounces of beer, 5 ounces of wine, or 1½ ounces of liquor  · Manage other health conditions  Follow your healthcare provider's advice on how to manage other conditions that can affect your heart health  These include diabetes, high blood pressure, and high cholesterol  You may need to take medicines for these conditions and make other lifestyle changes  · Ask if you should have a flu vaccine  The flu can be dangerous for a person who has CAD  The flu vaccine is available every year in the fall  Follow up with your healthcare provider as directed: You may need to return for other tests  You may also be referred to a cardiac surgeon  Write down your questions so you remember to ask them during your visits  © Copyright [employer] 2021 Information is for End User's use only and may not be sold, redistributed or otherwise used for commercial purposes   All illustrations and images included in CareNotes® are the copyrighted property of [employer]  or Mirna Garcia   The above information is an educational aid only  It is not intended as medical advice for individual conditions or treatments  Talk to your doctor, nurse or pharmacist before following any medical regimen to see if it is safe and effective for you

## 2021-09-15 DIAGNOSIS — Z95.1 S/P CABG (CORONARY ARTERY BYPASS GRAFT): ICD-10-CM

## 2021-09-15 DIAGNOSIS — I21.4 NSTEMI (NON-ST ELEVATED MYOCARDIAL INFARCTION) (HCC): ICD-10-CM

## 2021-09-15 DIAGNOSIS — I25.119 CORONARY ARTERY DISEASE INVOLVING NATIVE CORONARY ARTERY OF NATIVE HEART WITH ANGINA PECTORIS (HCC): ICD-10-CM

## 2021-09-15 RX ORDER — LISINOPRIL 10 MG/1
10 TABLET ORAL DAILY
Qty: 90 TABLET | Refills: 3 | Status: SHIPPED | OUTPATIENT
Start: 2021-09-15

## 2022-06-20 DIAGNOSIS — I21.4 NSTEMI (NON-ST ELEVATED MYOCARDIAL INFARCTION) (HCC): ICD-10-CM

## 2022-06-20 DIAGNOSIS — Z95.1 S/P CABG (CORONARY ARTERY BYPASS GRAFT): ICD-10-CM

## 2022-06-20 DIAGNOSIS — I25.119 CORONARY ARTERY DISEASE INVOLVING NATIVE CORONARY ARTERY OF NATIVE HEART WITH ANGINA PECTORIS (HCC): ICD-10-CM

## 2022-06-21 RX ORDER — METOPROLOL TARTRATE 100 MG/1
TABLET ORAL
Qty: 180 TABLET | Refills: 3 | Status: SHIPPED | OUTPATIENT
Start: 2022-06-21

## 2022-09-03 DIAGNOSIS — I21.4 NSTEMI (NON-ST ELEVATED MYOCARDIAL INFARCTION) (HCC): ICD-10-CM

## 2022-09-03 DIAGNOSIS — Z95.1 S/P CABG (CORONARY ARTERY BYPASS GRAFT): ICD-10-CM

## 2022-09-03 DIAGNOSIS — I25.119 CORONARY ARTERY DISEASE INVOLVING NATIVE CORONARY ARTERY OF NATIVE HEART WITH ANGINA PECTORIS (HCC): ICD-10-CM

## 2022-09-06 RX ORDER — LISINOPRIL 10 MG/1
TABLET ORAL
Qty: 90 TABLET | Refills: 0 | Status: SHIPPED | OUTPATIENT
Start: 2022-09-06

## 2022-09-06 NOTE — TELEPHONE ENCOUNTER
Requested medication(s) are due for refill today: yes    Patient has already received a courtesy refill: No  Other reason request has been forwarded to provider:

## 2022-11-30 DIAGNOSIS — I25.119 CORONARY ARTERY DISEASE INVOLVING NATIVE CORONARY ARTERY OF NATIVE HEART WITH ANGINA PECTORIS (HCC): ICD-10-CM

## 2022-11-30 DIAGNOSIS — Z95.1 S/P CABG (CORONARY ARTERY BYPASS GRAFT): ICD-10-CM

## 2022-11-30 DIAGNOSIS — I21.4 NSTEMI (NON-ST ELEVATED MYOCARDIAL INFARCTION) (HCC): ICD-10-CM

## 2022-12-01 RX ORDER — LISINOPRIL 10 MG/1
TABLET ORAL
Qty: 90 TABLET | Refills: 0 | Status: SHIPPED | OUTPATIENT
Start: 2022-12-01

## (undated) DEVICE — INTENDED FOR TISSUE SEPARATION, AND OTHER PROCEDURES THAT REQUIRE A SHARP SURGICAL BLADE TO PUNCTURE OR CUT.: Brand: BARD-PARKER SAFETY BLADES SIZE 15, STERILE

## (undated) DEVICE — TUBING INSUFFLATION SET ISO CONNECTOR

## (undated) DEVICE — SUT PROLENE 7-0 BV-1/BV-1 24 IN 8304H

## (undated) DEVICE — SUT SILK 0 CT-1 30 IN 424H

## (undated) DEVICE — GAUZE SPONGES,16 PLY: Brand: CURITY

## (undated) DEVICE — OASIS DRAIN, DUAL, IN-LINE, ATS COMPATIBLE: Brand: OASIS

## (undated) DEVICE — ELECTRODE BLADE E-Z CLEAN 4IN -0014A

## (undated) DEVICE — EVERGRIP INSERT SET 86MM: Brand: FOGARTY EVERGRIP

## (undated) DEVICE — SILVER-COATED ANTIBACTERIAL BARRIER DRESSING: Brand: ACTICOAT SURGIC 10X12CM 5PK US

## (undated) DEVICE — BLANKET HYPOTHERMIA ADULT GAYMAR

## (undated) DEVICE — SUT VICRYL PLUS 1 CTB-1 36 IN VCPB947H

## (undated) DEVICE — Device: Brand: RETRACT-O-TAPE 18G X 30.5CM BLUNT NEEDLE

## (undated) DEVICE — TRAY FOLEY 16FR SURESTEP TEMP SENS URIMETER STAT LOK

## (undated) DEVICE — SUCTION CATH 18 FR

## (undated) DEVICE — SUT MONOCRYL 4-0 PS-2 18 IN Y496G

## (undated) DEVICE — DRESSING ALLEVYN LIFE HEEL 25 X 25.2CM

## (undated) DEVICE — AORTIC PUNCH 5.2 MM DISP

## (undated) DEVICE — SYRINGE 50ML LL

## (undated) DEVICE — SUT SILK 2-0 SH CR/8 18 IN C012D

## (undated) DEVICE — PLUMEPEN PRO 10FT

## (undated) DEVICE — 32 FR RIGHT ANGLE – SOFT PVC CATHETER: Brand: PVC THORACIC CATHETERS

## (undated) DEVICE — BONE WAX WHITE: Brand: BONE WAX WHITE

## (undated) DEVICE — THERMOFLECT BLANKET, L, 25EA                               TS THERMOFLECT BLANKET, 48" X 84", SILVER, 5/BG, 5 BG/CS NW: Brand: THERMOFLECT

## (undated) DEVICE — ACE WRAP 6 IN UNSTERILE

## (undated) DEVICE — RECIP.STERNUM SAW BLADE 34/7.5/0.7MM: Brand: AESCULAP

## (undated) DEVICE — GLOVE SRG BIOGEL ECLIPSE 8

## (undated) DEVICE — SUT PROLENE 7-0 BV175-8/BV175-8 24 IN EPM8747

## (undated) DEVICE — SILVER-COATED ANTIBACTERIAL BARRIER DRESSING: Brand: ACTICOAT SURGIC 10X25CM 5PK US

## (undated) DEVICE — SUT PROLENE 5-0 C-1/C-1 36 IN 8321H

## (undated) DEVICE — PLEDGET CARDIO PTFE 9.5 X 4.8 SOFT LF (6EA/PK)

## (undated) DEVICE — LIGHT HANDLE COVER SLEEVE DISP BLUE STELLAR

## (undated) DEVICE — CATH STRAIGHT RED RUBBER 20 FR

## (undated) DEVICE — ANTIBACTERIAL UNDYED BRAIDED (POLYGLACTIN 910), SYNTHETIC ABSORBABLE SUTURE: Brand: COATED VICRYL

## (undated) DEVICE — HEMOCLIP CARTRIDGE LRG

## (undated) DEVICE — SUT SILK 2 60 IN SA8H

## (undated) DEVICE — PACK CABG PBDS

## (undated) DEVICE — INTENDED FOR TISSUE SEPARATION, AND OTHER PROCEDURES THAT REQUIRE A SHARP SURGICAL BLADE TO PUNCTURE OR CUT.: Brand: BARD-PARKER ® CARBON RIB-BACK BLADES

## (undated) DEVICE — VASOVIEW HEMOPRO 2: Brand: VASOVIEW HEMOPRO 2

## (undated) DEVICE — SUT PDS PLUS 1 CTB 36 IN PDPB359T

## (undated) DEVICE — SUT ETHIBOND 2-0 SH/SH 36 IN X523H

## (undated) DEVICE — FILTER SMOKE EVAC VIROSAFE

## (undated) DEVICE — ADHESIVE SKIN HIGH VISCOSITY EXOFIN 1ML

## (undated) DEVICE — ALCON OPHTHALMIC KNIFE 15 °: Brand: ALCON

## (undated) DEVICE — 3000CC GUARDIAN II: Brand: GUARDIAN

## (undated) DEVICE — GLOVE INDICATOR PI UNDERGLOVE SZ 8 BLUE

## (undated) DEVICE — PENCIL ELECTROSURG E-Z CLEAN -0035H

## (undated) DEVICE — SUT ETHIBOND 2-0 SH-1/SH-1 30 IN X763H

## (undated) DEVICE — SUT PROLENE 4-0 BB 36 IN 8581H

## (undated) DEVICE — BLADE BEAVER MINI SZ 69

## (undated) DEVICE — STERNAL WIRE

## (undated) DEVICE — 32 FR STRAIGHT – SOFT PVC CATHETER: Brand: PVC THORACIC CATHETERS

## (undated) DEVICE — 40601 PROLONGED POSITIONING SYSTEM: Brand: 40601 PROLONGED POSITIONING SYSTEM

## (undated) DEVICE — SUT MONOCRYL PLUS 3-0 PS-2 27 IN MCP427H